# Patient Record
Sex: FEMALE | Race: WHITE | HISPANIC OR LATINO | Employment: FULL TIME | URBAN - METROPOLITAN AREA
[De-identification: names, ages, dates, MRNs, and addresses within clinical notes are randomized per-mention and may not be internally consistent; named-entity substitution may affect disease eponyms.]

---

## 2017-03-20 ENCOUNTER — OFFICE VISIT (OUTPATIENT)
Dept: PEDIATRICS | Facility: CLINIC | Age: 32
End: 2017-03-20
Payer: COMMERCIAL

## 2017-03-20 VITALS
BODY MASS INDEX: 44.72 KG/M2 | OXYGEN SATURATION: 100 % | HEART RATE: 99 BPM | DIASTOLIC BLOOD PRESSURE: 87 MMHG | TEMPERATURE: 99.1 F | SYSTOLIC BLOOD PRESSURE: 132 MMHG | WEIGHT: 293 LBS

## 2017-03-20 DIAGNOSIS — H66.003 ACUTE SUPPURATIVE OTITIS MEDIA OF BOTH EARS WITHOUT SPONTANEOUS RUPTURE OF TYMPANIC MEMBRANES, RECURRENCE NOT SPECIFIED: Primary | ICD-10-CM

## 2017-03-20 DIAGNOSIS — R05.9 COUGH: ICD-10-CM

## 2017-03-20 DIAGNOSIS — J01.00 ACUTE NON-RECURRENT MAXILLARY SINUSITIS: ICD-10-CM

## 2017-03-20 PROCEDURE — 99213 OFFICE O/P EST LOW 20 MIN: CPT | Performed by: PHYSICIAN ASSISTANT

## 2017-03-20 RX ORDER — FLUTICASONE PROPIONATE 50 MCG
2 SPRAY, SUSPENSION (ML) NASAL DAILY
Qty: 1 BOTTLE | Refills: 0 | Status: SHIPPED | OUTPATIENT
Start: 2017-03-20 | End: 2018-12-13

## 2017-03-20 NOTE — PROGRESS NOTES
SUBJECTIVE:                                                    Elodia Huber is a 31 year old female who presents to clinic today for the following health issues    Acute Illness   Acute illness concerns: Possible sinus infection  Onset: 2wks ago with left ear clogging    Fever: no    Chills/Sweats: no    Headache (location?): no    Sinus Pressure:YES- tender, post-nasal drainage, facial pain and teeth pain x last night    Conjunctivitis:  no    Ear Pain: YES: left only,ringing/tenderness    Rhinorrhea: YES    Congestion: YES    Sore Throat: YES- very occasionally with swallowing     Cough: YES-hard to sleep    Wheeze: YES- a little bit at night due to lying down    Decreased Appetite: YES    Nausea: no    Vomiting: no    Diarrhea:  no    Dysuria/Freq.: no    Fatigue/Achiness: YES- fatigue only due to not sleeping and coughing at night    Sick/Strep Exposure: no     Therapies Tried and outcome: Sudafed,Genet Sasser Plus Cold and Cough,Nyquil,cough drops,nasal spray-not working.  No history of asthma, allergies.   Former smoker.   Work: industrial     ROS:  ROS otherwise negative    OBJECTIVE:                                                    /87  Pulse 99  Temp 99.1  F (37.3  C) (Tympanic)  Wt 294 lb 1.6 oz (133.4 kg)  SpO2 100%  BMI 44.72 kg/m2  Body mass index is 44.72 kg/(m^2).   GENERAL: alert, no distress  HENT: ear canals- normal; TMs- erythemic on right; Nose- normal; Mouth- no ulcers, no lesions; max sinus tenderness  NECK: tonsillar LAD  RESP: lungs clear to auscultation - no rales, no rhonchi, no wheezes  CV: regular rates and rhythm, normal S1 S2, no S3 or S4 and no murmur, no click or rub    Diagnostic test results:  No results found for this or any previous visit (from the past 24 hour(s)).     ASSESSMENT/PLAN:                                                    (H66.003) Acute suppurative otitis media of both ears without spontaneous rupture of tympanic membranes, recurrence not  specified  (primary encounter diagnosis)  Comment: begin antibiotics and flonase. Increase fluid intake.   Plan: amoxicillin-clavulanate (AUGMENTIN) 875-125 MG         per tablet, fluticasone (FLONASE) 50 MCG/ACT         spray            (J01.00) Acute non-recurrent maxillary sinusitis  Comment:   Plan: amoxicillin-clavulanate (AUGMENTIN) 875-125 MG         per tablet, fluticasone (FLONASE) 50 MCG/ACT         spray            (R05) Cough  Comment:   Plan:       See Patient Instructions    Mariposa Giles PA-C  [facility]

## 2017-03-20 NOTE — MR AVS SNAPSHOT
After Visit Summary   3/20/2017    Elodia Huber    MRN: [id number]           Patient Information     Date Of Birth          1985        Visit Information        Provider Department      3/20/2017 2:30 PM Mariposa Giles PA-C [facility]        Today's Diagnoses     Acute suppurative otitis media of both ears without spontaneous rupture of tympanic membranes, recurrence not specified    -  1    Acute non-recurrent maxillary sinusitis        Cough          Care Instructions    Begin antibiotics   Increase fluid intake  flonase daily        Follow-ups after your visit        Who to contact     If you have questions or need follow up information about today's clinic visit or your schedule please contact [facility] directly at [phone].  Normal or non-critical lab and imaging results will be communicated to you by Veaconhart, letter or phone within 4 business days after the clinic has received the results. If you do not hear from us within 7 days, please contact the clinic through Veaconhart or phone. If you have a critical or abnormal lab result, we will notify you by phone as soon as possible.  Submit refill requests through [employer] or call your pharmacy and they will forward the refill request to us. Please allow 3 business days for your refill to be completed.          Additional Information About Your Visit        MyChart Information     [employer] gives you secure access to your electronic health record. If you see a primary care provider, you can also send messages to your care team and make appointments. If you have questions, please call your primary care clinic.  If you do not have a primary care provider, please call [phone] and they will assist you.        Care EveryWhere ID     This is your Care EveryWhere ID. This could be used by other organizations to access your [facility] medical records  [id number]        Your Vitals Were     Pulse Temperature  Pulse Oximetry BMI (Body Mass Index)          99 99.1  F (37.3  C) (Tympanic) 100% 44.72 kg/m2         Blood Pressure from Last 3 Encounters:   03/20/17 132/87   09/13/16 138/84   06/22/16 110/74    Weight from Last 3 Encounters:   03/20/17 294 lb 1.6 oz (133.4 kg)   09/13/16 294 lb 14.4 oz (133.8 kg)   06/22/16 291 lb (132 kg)              Today, you had the following     No orders found for display         Today's Medication Changes          These changes are accurate as of: 3/20/17  2:52 PM.  If you have any questions, ask your nurse or doctor.               Start taking these medicines.        Dose/Directions    amoxicillin-clavulanate 875-125 MG per tablet   Commonly known as:  AUGMENTIN   Used for:  Acute suppurative otitis media of both ears without spontaneous rupture of tympanic membranes, recurrence not specified, Acute non-recurrent maxillary sinusitis   Started by:  Mariposa Giles PA-C        Dose:  1 tablet   Take 1 tablet by mouth 2 times daily   Quantity:  20 tablet   Refills:  0       fluticasone 50 MCG/ACT spray   Commonly known as:  FLONASE   Used for:  Acute suppurative otitis media of both ears without spontaneous rupture of tympanic membranes, recurrence not specified, Acute non-recurrent maxillary sinusitis   Started by:  Mariposa Giles PA-C        Dose:  2 spray   Spray 2 sprays into both nostrils daily   Quantity:  1 Bottle   Refills:  0            Where to get your medicines      These medications were sent to [facility] [address] [place] [address] [address]  [address] [facility] [address] [address]     Phone:  [phone]     amoxicillin-clavulanate 875-125 MG per tablet         Some of these will need a paper prescription and others can be bought over the counter.  Ask your nurse if you have questions.     Bring a paper prescription for each of these medications     fluticasone 50 MCG/ACT spray                 Primary Care Provider Office Phone # Fax #    JASON Gallagher [phone]       [facility] [place] [address] [place] [address]        Thank you!     Thank you for choosing [facility]  for your care. Our goal is always to provide you with excellent care. Hearing back from our patients is one way we can continue to improve our services. Please take a few minutes to complete the written survey that you may receive in the mail after your visit with us. Thank you!             Your Updated Medication List - Protect others around you: Learn how to safely use, store and throw away your medicines at www.disposemymeds.org.          This list is accurate as of: 3/20/17  2:52 PM.  Always use your most recent med list.                   Brand Name Dispense Instructions for use    amoxicillin-clavulanate 875-125 MG per tablet    AUGMENTIN    20 tablet    Take 1 tablet by mouth 2 times daily       fluticasone 50 MCG/ACT spray    FLONASE    1 Bottle    Spray 2 sprays into both nostrils daily       norgestimate-ethinyl estradiol 0.25-35 MG-MCG per tablet    ORTHO-CYCLEN, SPRINTEC    84 tablet    Take 1 tablet by mouth daily

## 2017-03-20 NOTE — NURSING NOTE
"Chief Complaint   Patient presents with     URI       Initial /87  Pulse 99  Temp 99.1  F (37.3  C) (Tympanic)  Wt 294 lb 1.6 oz (133.4 kg)  SpO2 100%  BMI 44.72 kg/m2 Estimated body mass index is 44.72 kg/(m^2) as calculated from the following:    Height as of 9/13/16: 5' 8\" (1.727 m).    Weight as of this encounter: 294 lb 1.6 oz (133.4 kg).  Medication Reconciliation: complete    "

## 2017-03-23 ENCOUNTER — TELEPHONE (OUTPATIENT)
Dept: PEDIATRICS | Facility: CLINIC | Age: 32
End: 2017-03-23

## 2017-03-23 NOTE — TELEPHONE ENCOUNTER
Pt notifies that she is feeling way better after started abx(augmentin), still using flonase nasal spray. Most of the sx's are better, but still both ears are plugged. Denies pain, hearing loss, discharge from ears or any new sx's.    Advised pt that it will take more time for the fluids to drain out. Advised to continue abx, flonase nasal spray, decongestant prn, push fluids & monitor. Pt agrees.    Abhijit RN  Triage Nurse

## 2017-03-30 ENCOUNTER — TELEPHONE (OUTPATIENT)
Dept: PEDIATRICS | Facility: CLINIC | Age: 32
End: 2017-03-30

## 2017-03-30 NOTE — TELEPHONE ENCOUNTER
Ears are popping since Monday.  They are clear but then, plug.  Using nasal spray bid.  Antibiotic is done today for sinus infection.  Asked pt to use neti pot and decongestant at this time.  Call for appointment if not improving.  /100 at med clinic at work yesterday.  BP Readings from Last 3 Encounters:   03/20/17 132/87   09/13/16 138/84   06/22/16 110/74   Pt's BP does run a little higher.  Asked her to monitor and schedule appointment if it continues.  She should limit her salt intake.  Maria Elena Carbone RN

## 2017-07-24 DIAGNOSIS — Z30.49 ENCOUNTER FOR SURVEILLANCE OF OTHER CONTRACEPTIVE: ICD-10-CM

## 2017-07-24 RX ORDER — NORGESTIMATE AND ETHINYL ESTRADIOL 0.25-0.035
1 KIT ORAL DAILY
Qty: 84 TABLET | Refills: 0 | Status: SHIPPED | OUTPATIENT
Start: 2017-07-24 | End: 2017-10-06

## 2017-07-24 NOTE — TELEPHONE ENCOUNTER
Patient is calling for refill of oral contraceptive pill.  States that she doesn't usually take the pill daily, but on occasion has skipped the placebos.  Has not misplaced any pill packs.  I spoke with pharmacist and patient did fill the RXs on  09/13/16 12/03/16 01/24/17 04/11/17.    [facility]      Last Written Prescription Date: 09/13/16  Last Fill Quantity: 84,  # refills: 3   Last Office Visit with [facility] or [facility] prescribing provider:09/13/16.    Routing refill request to provider for review/approval because:  Patient is needing a refill two months early .    Order pending for 3 month refill, please sign if in agreement.  ALIA Ogden RN

## 2017-10-06 ENCOUNTER — OFFICE VISIT (OUTPATIENT)
Dept: PEDIATRICS | Facility: CLINIC | Age: 32
End: 2017-10-06
Payer: COMMERCIAL

## 2017-10-06 VITALS
DIASTOLIC BLOOD PRESSURE: 84 MMHG | OXYGEN SATURATION: 99 % | SYSTOLIC BLOOD PRESSURE: 126 MMHG | TEMPERATURE: 98.6 F | HEART RATE: 96 BPM | BODY MASS INDEX: 40.77 KG/M2 | HEIGHT: 68 IN | WEIGHT: 269 LBS

## 2017-10-06 DIAGNOSIS — M72.2 PLANTAR FASCIITIS: ICD-10-CM

## 2017-10-06 DIAGNOSIS — E04.9 THYROID GOITER: ICD-10-CM

## 2017-10-06 DIAGNOSIS — Z00.00 ROUTINE HEALTH MAINTENANCE: Primary | ICD-10-CM

## 2017-10-06 DIAGNOSIS — Z30.49 ENCOUNTER FOR SURVEILLANCE OF OTHER CONTRACEPTIVE: ICD-10-CM

## 2017-10-06 DIAGNOSIS — E55.9 VITAMIN D DEFICIENCY: ICD-10-CM

## 2017-10-06 DIAGNOSIS — E66.9 OBESITY (BMI 30-39.9): ICD-10-CM

## 2017-10-06 PROBLEM — E66.01 MORBID OBESITY (H): Status: ACTIVE | Noted: 2017-10-06

## 2017-10-06 PROCEDURE — 84443 ASSAY THYROID STIM HORMONE: CPT | Performed by: NURSE PRACTITIONER

## 2017-10-06 PROCEDURE — 36415 COLL VENOUS BLD VENIPUNCTURE: CPT | Performed by: NURSE PRACTITIONER

## 2017-10-06 PROCEDURE — 99212 OFFICE O/P EST SF 10 MIN: CPT | Mod: 25 | Performed by: NURSE PRACTITIONER

## 2017-10-06 PROCEDURE — G0145 SCR C/V CYTO,THINLAYER,RESCR: HCPCS | Performed by: NURSE PRACTITIONER

## 2017-10-06 PROCEDURE — 99395 PREV VISIT EST AGE 18-39: CPT | Performed by: NURSE PRACTITIONER

## 2017-10-06 PROCEDURE — 87624 HPV HI-RISK TYP POOLED RSLT: CPT | Performed by: NURSE PRACTITIONER

## 2017-10-06 RX ORDER — NORGESTIMATE AND ETHINYL ESTRADIOL 0.25-0.035
1 KIT ORAL DAILY
Qty: 84 TABLET | Refills: 3 | Status: SHIPPED | OUTPATIENT
Start: 2017-10-06 | End: 2018-09-14

## 2017-10-06 NOTE — NURSING NOTE
"Chief Complaint   Patient presents with     Physical       Initial /84 (Cuff Size: Adult Large)  Pulse 96  Temp 98.6  F (37  C) (Tympanic)  Ht 5' 8\" (1.727 m)  Wt 269 lb (122 kg)  LMP 09/14/2017 (Exact Date)  SpO2 99%  BMI 40.9 kg/m2 Estimated body mass index is 40.9 kg/(m^2) as calculated from the following:    Height as of this encounter: 5' 8\" (1.727 m).    Weight as of this encounter: 269 lb (122 kg).  Medication Reconciliation: complete   Chica Jesus CMA    "

## 2017-10-06 NOTE — PROGRESS NOTES
SUBJECTIVE:   CC: Elodia Huber is an 32 year old woman who presents for preventive health visit.     Physical   Annual:     Getting at least 3 servings of Calcium per day::  Yes    Bi-annual eye exam::  NO    Dental care twice a year::  NO    Sleep apnea or symptoms of sleep apnea::  None    Diet::  Vegetarian/vegan    Frequency of exercise::  2-3 days/week    Duration of exercise::  15-30 minutes    Taking medications regularly::  Yes    Medication side effects::  None    Additional concerns today::  No    -------------------------------------    Today's PHQ-2 Score:   PHQ-2 ( 1999 [employer]) 10/6/2017   Q1: Little interest or pleasure in doing things 0   Q2: Feeling down, depressed or hopeless 0   PHQ-2 Score 0   Q1: Little interest or pleasure in doing things Not at all   Q2: Feeling down, depressed or hopeless Not at all   PHQ-2 Score 0       Abuse: Current or Past(Physical, Sexual or Emotional)- No  Do you feel safe in your environment - Yes    Social History   Substance Use Topics     Smoking status: Former Smoker     Types: Cigarettes     Quit date: 6/1/2015     Smokeless tobacco: Never Used     Alcohol use 0.0 oz/week     0 Standard drinks or equivalent per week      Comment: 1 time per week, 2 per time     The patient does not drink >3 drinks per day nor >7 drinks per week.    Reviewed orders with patient.  Reviewed health maintenance and updated orders accordingly - Yes  Labs reviewed in EPIC    Mammogram not appropriate for this patient based on age.    Pertinent mammograms are reviewed under the imaging tab.  History of abnormal Pap smear: NO - age 30-65 PAP every 5 years with negative HPV co-testing recommended    Reviewed and updated as needed this visit by clinical staff  Tobacco  Allergies  Meds  Med Hx  Surg Hx  Fam Hx  Soc Hx        Reviewed and updated as needed this visit by Provider          Patient with right heel pain. Jogs in place every morning without shoes    ROS:  C: NEGATIVE for  "fever, chills, change in weight  I: NEGATIVE for worrisome rashes, moles or lesions  E: NEGATIVE for vision changes or irritation  ENT: NEGATIVE for ear, mouth and throat problems  R: NEGATIVE for significant cough or SOB  B: NEGATIVE for masses, tenderness or discharge  CV: NEGATIVE for chest pain, palpitations or peripheral edema  GI: NEGATIVE for nausea, abdominal pain, heartburn, or change in bowel habits  : NEGATIVE for unusual urinary or vaginal symptoms. Periods are regular.  M: Right heel plantar pain. NEGATIVE for significant arthralgias or myalgia  N: NEGATIVE for weakness, dizziness or paresthesias  P: NEGATIVE for changes in mood or affect     OBJECTIVE:   /84 (Cuff Size: Adult Large)  Pulse 96  Temp 98.6  F (37  C) (Tympanic)  Ht 5' 8\" (1.727 m)  Wt 269 lb (122 kg)  LMP 09/14/2017 (Exact Date)  SpO2 99%  BMI 40.9 kg/m2  EXAM:  GENERAL: healthy, alert and no distress  EYES: Eyes grossly normal to inspection, PERRL and conjunctivae and sclerae normal  HENT: ear canals and TM's normal, nose and mouth without ulcers or lesions  NECK: no adenopathy, no asymmetry, masses, or scars and thyroid normal to palpation  RESP: lungs clear to auscultation - no rales, rhonchi or wheezes  BREAST: normal without masses, tenderness or nipple discharge and no palpable axillary masses or adenopathy  CV: regular rate and rhythm, normal S1 S2, no S3 or S4, no murmur, click or rub, no peripheral edema and peripheral pulses strong  ABDOMEN: soft, nontender, no hepatosplenomegaly, no masses and bowel sounds normal   (female): normal female external genitalia, normal urethral meatus, vaginal mucosa pink, moist, well rugated, and normal cervix/adnexa/uterus without masses or discharge  MS: no gross musculoskeletal defects noted, no edema  SKIN: no suspicious lesions or rashes  NEURO: Normal strength and tone, mentation intact and speech normal  PSYCH: mentation appears normal, affect " "normal/bright    ASSESSMENT/PLAN:   (Z00.00) Routine health maintenance  (primary encounter diagnosis)  Plan: Pap imaged thin layer screen with HPV -         recommended age 30 - 65 years (select HPV order        below), HPV High Risk Types DNA Cervical, TSH         with free T4 reflex            (Z30.49) Encounter for surveillance of other contraceptive  Comment: No contraindications.   Plan: norgestimate-ethinyl estradiol (ORTHO-CYCLEN,         SPRINTEC) 0.25-35 MG-MCG per tablet            (E66.9) Obesity (BMI 30-39.9)  Comment: Has lost 30 lbs jogging in place in her living room.   Plan: Goal is to be at 150 in 2 months. RTC for weight check 2 months.     (E04.9) Thyroid goiter  Comment: Hx thyroid issue.   Plan: TSH with T4 reflex today.     (E55.9) Vitamin D deficiency  Comment: Doesn't want to pay for vitamin D testing if possible.   Plan: Start 2,000 iu vitamin D3 daily.     (M72.2) Plantar fasciitis  Comment: Patient with right heel pain. Jogs in place every morning without shoes. Sx consistent with plantar fascitis.   Plan: Morning/evening stretching.  Supportive shoes  Never barefoot.         COUNSELING:  Reviewed preventive health counseling, as reflected in patient instructions    BP Screening:   Last 3 BP Readings:    BP Readings from Last 3 Encounters:   10/06/17 126/84   03/20/17 132/87   09/13/16 138/84       The following was recommended to the patient:  Re-screen BP within a year and recommended lifestyle modifications     reports that she quit smoking about 2 years ago. Her smoking use included Cigarettes. She has never used smokeless tobacco.    Estimated body mass index is 40.9 kg/(m^2) as calculated from the following:    Height as of this encounter: 5' 8\" (1.727 m).    Weight as of this encounter: 269 lb (122 kg).   Weight management plan: Discussed healthy diet and exercise guidelines and patient will follow up in 2 months in clinic to re-evaluate.    Counseling Resources:  ATP IV " Guidelines  Pooled Cohorts Equation Calculator  Breast Cancer Risk Calculator  FRAX Risk Assessment  ICSI Preventive Guidelines  Dietary Guidelines for Americans, 2010  [employer]'s MyPlate  ASA Prophylaxis  Lung CA Screening    Bridgett Junior, JASON FARFAN  [facility]

## 2017-10-06 NOTE — PATIENT INSTRUCTIONS
Goal 250 in 2 months. Come in for a weight check.     2,000 iu vitamin D3 per day.     Preventive Health Recommendations  Female Ages 26 - 39  Yearly exam:   See your health care provider every year in order to    Review health changes.     Discuss preventive care.      Review your medicines if you your doctor has prescribed any.    Until age 30: Get a Pap test every three years (more often if you have had an abnormal result).    After age 30: Talk to your doctor about whether you should have a Pap test every 3 years or have a Pap test with HPV screening every 5 years.   You do not need a Pap test if your uterus was removed (hysterectomy) and you have not had cancer.  You should be tested each year for STDs (sexually transmitted diseases), if you're at risk.   Talk to your provider about how often to have your cholesterol checked.  If you are at risk for diabetes, you should have a diabetes test (fasting glucose).  Shots: Get a flu shot each year. Get a tetanus shot every 10 years.   Nutrition:     Eat at least 5 servings of fruits and vegetables each day.    Eat whole-grain bread, whole-wheat pasta and brown rice instead of white grains and rice.    Talk to your provider about Calcium and Vitamin D.     Lifestyle    Exercise at least 150 minutes a week (30 minutes a day, 5 days of the week). This will help you control your weight and prevent disease.    Limit alcohol to one drink per day.    No smoking.     Wear sunscreen to prevent skin cancer.    See your dentist every six months for an exam and cleaning.

## 2017-10-06 NOTE — MR AVS SNAPSHOT
After Visit Summary   10/6/2017    Elodia Huber    MRN: [id number]           Patient Information     Date Of Birth          1985        Visit Information        Provider Department      10/6/2017 3:20 PM Bridgett Junior APRN [facility]        Today's Diagnoses     Routine health maintenance    -  1    Encounter for surveillance of other contraceptive        Obesity (BMI 30-39.9)        Thyroid goiter        Vitamin D deficiency          Care Instructions    Goal 250 in 2 months. Come in for a weight check.     2,000 iu vitamin D3 per day.     Preventive Health Recommendations  Female Ages 26 - 39  Yearly exam:   See your health care provider every year in order to    Review health changes.     Discuss preventive care.      Review your medicines if you your doctor has prescribed any.    Until age 30: Get a Pap test every three years (more often if you have had an abnormal result).    After age 30: Talk to your doctor about whether you should have a Pap test every 3 years or have a Pap test with HPV screening every 5 years.   You do not need a Pap test if your uterus was removed (hysterectomy) and you have not had cancer.  You should be tested each year for STDs (sexually transmitted diseases), if you're at risk.   Talk to your provider about how often to have your cholesterol checked.  If you are at risk for diabetes, you should have a diabetes test (fasting glucose).  Shots: Get a flu shot each year. Get a tetanus shot every 10 years.   Nutrition:     Eat at least 5 servings of fruits and vegetables each day.    Eat whole-grain bread, whole-wheat pasta and brown rice instead of white grains and rice.    Talk to your provider about Calcium and Vitamin D.     Lifestyle    Exercise at least 150 minutes a week (30 minutes a day, 5 days of the week). This will help you control your weight and prevent disease.    Limit alcohol to one drink per day.    No smoking.     Wear  "sunscreen to prevent skin cancer.    See your dentist every six months for an exam and cleaning.            Follow-ups after your visit        Who to contact     If you have questions or need follow up information about today's clinic visit or your schedule please contact [facility] directly at [phone].  Normal or non-critical lab and imaging results will be communicated to you by MyChart, letter or phone within 4 business days after the clinic has received the results. If you do not hear from us within 7 days, please contact the clinic through [employer] or phone. If you have a critical or abnormal lab result, we will notify you by phone as soon as possible.  Submit refill requests through [employer] or call your pharmacy and they will forward the refill request to us. Please allow 3 business days for your refill to be completed.          Additional Information About Your Visit        MyChart Information     [employer] gives you secure access to your electronic health record. If you see a primary care provider, you can also send messages to your care team and make appointments. If you have questions, please call your primary care clinic.  If you do not have a primary care provider, please call [phone] and they will assist you.        Care EveryWhere ID     This is your Care EveryWhere ID. This could be used by other organizations to access your [facility] medical records  [id number]        Your Vitals Were     Pulse Temperature Height Last Period Pulse Oximetry BMI (Body Mass Index)    96 98.6  F (37  C) (Tympanic) 5' 8\" (1.727 m) 09/14/2017 (Exact Date) 99% 40.9 kg/m2       Blood Pressure from Last 3 Encounters:   10/06/17 126/84   03/20/17 132/87   09/13/16 138/84    Weight from Last 3 Encounters:   10/06/17 269 lb (122 kg)   03/20/17 294 lb 1.6 oz (133.4 kg)   09/13/16 294 lb 14.4 oz (133.8 kg)              We Performed the Following     HPV High Risk Types DNA Cervical     Pap imaged thin layer " screen with HPV - recommended age 30 - 65 years (select HPV order below)     TSH with free T4 reflex          Where to get your medicines      These medications were sent to [id number] - [place], [place] - [address] AT [facility][note 3] & formerly [facility]  [address] [place] [address], [place] [address]     Phone:  [phone]     norgestimate-ethinyl estradiol 0.25-35 MG-MCG per tablet          Primary Care Provider Office Phone # Fax #    JASON Gallagher [phone] [address] [place] [address]        Equal Access to Services     [facility]: Hadii shanna thomas hadcamden Olson, waaxda luqadaha, qaybta kaalmada tamieyajordan, jonh miles . So [facility] [phone].    ATENCIÓN: Si habla español, tiene a way disposición servicios gratuitos de asistencia lingüística. [facility] [phone].    We comply with applicable federal civil rights laws and [place] laws. We do not discriminate on the basis of race, color, national origin, age, disability, sex, sexual orientation, or gender identity.            Thank you!     Thank you for choosing [facility]  for your care. Our goal is always to provide you with excellent care. Hearing back from our patients is one way we can continue to improve our services. Please take a few minutes to complete the written survey that you may receive in the mail after your visit with us. Thank you!             Your Updated Medication List - Protect others around you: Learn how to safely use, store and throw away your medicines at www.disposemymeds.org.          This list is accurate as of: 10/6/17  4:00 PM.  Always use your most recent med list.                   Brand Name Dispense Instructions for use Diagnosis    fluticasone 50 MCG/ACT spray    FLONASE    1 Bottle    Spray 2 sprays into both nostrils daily    Acute suppurative otitis media of both ears without spontaneous rupture of  tympanic membranes, recurrence not specified, Acute non-recurrent maxillary sinusitis       norgestimate-ethinyl estradiol 0.25-35 MG-MCG per tablet    ORTHO-CYCLEN, SPRINTEC    84 tablet    Take 1 tablet by mouth daily    Encounter for surveillance of other contraceptive

## 2017-10-07 LAB — TSH SERPL DL<=0.005 MIU/L-ACNC: 0.96 MU/L (ref 0.4–4)

## 2017-10-10 LAB
COPATH REPORT: NORMAL
PAP: NORMAL

## 2017-10-12 LAB
FINAL DIAGNOSIS: NORMAL
HPV HR 12 DNA CVX QL NAA+PROBE: NEGATIVE
HPV16 DNA SPEC QL NAA+PROBE: NEGATIVE
HPV18 DNA SPEC QL NAA+PROBE: NEGATIVE
SPECIMEN DESCRIPTION: NORMAL

## 2018-09-14 DIAGNOSIS — Z30.49 ENCOUNTER FOR SURVEILLANCE OF OTHER CONTRACEPTIVE: ICD-10-CM

## 2018-09-14 NOTE — TELEPHONE ENCOUNTER
"Requested Prescriptions   Pending Prescriptions Disp Refills     ESTARYLLA 0.25-35 MG-MCG per tablet [Pharmacy Med Name: ESTARYLLA TABLETS 28S] 84 tablet 0    Last Written Prescription Date:  10/06/2017  Last Fill Quantity: 84 tablet,  # refills: 3   Last office visit: 10/6/2017 with prescribing provider:  EMILEE Junior   Future Office Visit:     Sig: TAKE 1 TABLET BY MOUTH DAILY    Contraceptives Protocol Passed    9/14/2018 12:56 PM       Passed - Patient is not a current smoker if age is 35 or older       Passed - Recent (12 mo) or future (30 days) visit within the authorizing provider's specialty    Patient had office visit in the last 12 months or has a visit in the next 30 days with authorizing provider or within the authorizing provider's specialty.  See \"Patient Info\" tab in inbasket, or \"Choose Columns\" in Meds & Orders section of the refill encounter.           Passed - No active pregnancy on record       Passed - No positive pregnancy test in past 12 months          "

## 2018-09-18 RX ORDER — NORGESTIMATE AND ETHINYL ESTRADIOL 0.25-0.035
KIT ORAL
Qty: 84 TABLET | Refills: 0 | Status: SHIPPED | OUTPATIENT
Start: 2018-09-18 | End: 2018-12-05

## 2018-12-05 ENCOUNTER — NURSE TRIAGE (OUTPATIENT)
Dept: NURSING | Facility: CLINIC | Age: 33
End: 2018-12-05

## 2018-12-05 DIAGNOSIS — Z30.49 ENCOUNTER FOR SURVEILLANCE OF OTHER CONTRACEPTIVE: ICD-10-CM

## 2018-12-05 RX ORDER — NORGESTIMATE AND ETHINYL ESTRADIOL 0.25-0.035
1 KIT ORAL DAILY
Qty: 84 TABLET | Refills: 0 | Status: SHIPPED | OUTPATIENT
Start: 2018-12-05 | End: 2019-02-24

## 2018-12-06 NOTE — TELEPHONE ENCOUNTER
Patient going to run out of her birth control.  Schedulers helped her make an appointment on December 13, 2018.  Did order one refill sent to her [facility]'s pharmacy.  Routed to  triage station A  Kaur Moe RN  [facility]

## 2018-12-06 NOTE — TELEPHONE ENCOUNTER
Additional Information    Caller requesting a refill, no triage required, and triager able to refill per unit policy    Protocols used: MEDICATION QUESTION CALL-ADULT-AH

## 2018-12-06 NOTE — TELEPHONE ENCOUNTER
Appointment scheduled on 12/13/18 for px & 3 months supply sent yesterday.    Abhijit, RN  Triage Nurse

## 2018-12-06 NOTE — TELEPHONE ENCOUNTER
Patient going to run out of her birth control.  Schedulers helped her make an appointment on December 13, 2018.  Did order one refill sent to her pharmacy.  Routed message to provider.  Kaur Moe RN  [facility]

## 2018-12-13 ENCOUNTER — OFFICE VISIT (OUTPATIENT)
Dept: PEDIATRICS | Facility: CLINIC | Age: 33
End: 2018-12-13
Payer: COMMERCIAL

## 2018-12-13 DIAGNOSIS — Z00.00 ENCOUNTER FOR ROUTINE ADULT HEALTH EXAMINATION WITHOUT ABNORMAL FINDINGS: Primary | ICD-10-CM

## 2018-12-13 DIAGNOSIS — Z13.1 SCREENING FOR DIABETES MELLITUS: ICD-10-CM

## 2018-12-13 DIAGNOSIS — E66.01 OBESITY, CLASS III, BMI 40-49.9 (MORBID OBESITY) (H): ICD-10-CM

## 2018-12-13 DIAGNOSIS — Z23 NEED FOR PROPHYLACTIC VACCINATION AND INOCULATION AGAINST INFLUENZA: ICD-10-CM

## 2018-12-13 LAB
CHOLEST SERPL-MCNC: 163 MG/DL
HBA1C MFR BLD: 5.1 % (ref 0–5.6)
HDLC SERPL-MCNC: 57 MG/DL
LDLC SERPL CALC-MCNC: 82 MG/DL
NONHDLC SERPL-MCNC: 106 MG/DL
TRIGL SERPL-MCNC: 122 MG/DL

## 2018-12-13 PROCEDURE — 36415 COLL VENOUS BLD VENIPUNCTURE: CPT | Performed by: INTERNAL MEDICINE

## 2018-12-13 PROCEDURE — 90471 IMMUNIZATION ADMIN: CPT | Performed by: STUDENT IN AN ORGANIZED HEALTH CARE EDUCATION/TRAINING PROGRAM

## 2018-12-13 PROCEDURE — 90686 IIV4 VACC NO PRSV 0.5 ML IM: CPT | Performed by: STUDENT IN AN ORGANIZED HEALTH CARE EDUCATION/TRAINING PROGRAM

## 2018-12-13 PROCEDURE — 83036 HEMOGLOBIN GLYCOSYLATED A1C: CPT | Performed by: INTERNAL MEDICINE

## 2018-12-13 PROCEDURE — 80061 LIPID PANEL: CPT | Performed by: INTERNAL MEDICINE

## 2018-12-13 PROCEDURE — 99395 PREV VISIT EST AGE 18-39: CPT | Mod: GC | Performed by: STUDENT IN AN ORGANIZED HEALTH CARE EDUCATION/TRAINING PROGRAM

## 2018-12-13 SDOH — HEALTH STABILITY: PHYSICAL HEALTH: ON AVERAGE, HOW MANY MINUTES DO YOU ENGAGE IN EXERCISE AT THIS LEVEL?: 30 MIN

## 2018-12-13 SDOH — HEALTH STABILITY: PHYSICAL HEALTH: ON AVERAGE, HOW MANY DAYS PER WEEK DO YOU ENGAGE IN MODERATE TO STRENUOUS EXERCISE (LIKE A BRISK WALK)?: 2 DAYS

## 2018-12-13 ASSESSMENT — ENCOUNTER SYMPTOMS
HEARTBURN: 0
NERVOUS/ANXIOUS: 1
HEMATURIA: 0
PARESTHESIAS: 0
HEMATOCHEZIA: 0
CONSTIPATION: 0
PALPITATIONS: 0
DIARRHEA: 0
SHORTNESS OF BREATH: 0
SORE THROAT: 0
DYSURIA: 0
NAUSEA: 0
FEVER: 0
DIZZINESS: 0
FREQUENCY: 0
ARTHRALGIAS: 0
EYE PAIN: 0
ABDOMINAL PAIN: 0
HEADACHES: 0
CHILLS: 0
WEAKNESS: 0
MYALGIAS: 0
BREAST MASS: 0
COUGH: 0
JOINT SWELLING: 0

## 2018-12-13 ASSESSMENT — MIFFLIN-ST. JEOR: SCORE: 1972.77

## 2018-12-13 NOTE — PROGRESS NOTES
"   SUBJECTIVE:   CC: Elodia Huber is an 33 year old woman who presents for preventive health visit.     Physical   Annual:     Getting at least 3 servings of Calcium per day:  Yes    Bi-annual eye exam:  NO    Dental care twice a year:  NO    Sleep apnea or symptoms of sleep apnea:  None    Diet:  Vegetarian/vegan    Frequency of exercise:  2-3 days/week    Duration of exercise:  30-45 minutes    Taking medications regularly:  Yes    Medication side effects:  None    Additional concerns today:  No      Today's PHQ-2 Score:   PHQ-2 ( 1999 Pf[employer]) 12/13/2018   Q1: Little interest or pleasure in doing things 0   Q2: Feeling down, depressed or hopeless 0   PHQ-2 Score 0   Q1: Little interest or pleasure in doing things Not at all   Q2: Feeling down, depressed or hopeless Not at all   PHQ-2 Score 0     Concerns:  1. Weight  Has been working on losing weight over the last several years. Per last annual visit, had successfully lost ~50 lbs by jogging in place. Over the last year though weight has stayed stable. Difficulty finding time to exercise - has a stationary bike at home but doesn't wake up early in the morning to work out. Exercises ~1-2 times a week, 30 minutes each time. She is a vegetarian, thinks she eats pretty healthy for lunch and dinner as she does meal plan for the week, but has unhealthy snacks at work. Has large portion sizes, is a \".\" No pop/soda. Adds sugar to her coffee only on weekends, otherwise drinks it black. Occasional sugary drinks/juice.     Wonders if may be due to thyroid but has no other sxs of hypothyroidism and has had previously normal values, last in 2017.     2. HM  Pap normal, HPV negative in 2017.  Needs Tdap but does not want it today.  Would like flu shot today.  Not interested in STD screening - monogamous with same male partner since age 16.     Abuse: Current or Past(Physical, Sexual or Emotional)- No  Do you feel safe in your environment? Yes    Social History "     Tobacco Use     Smoking status: Former Smoker     Types: Cigarettes     Last attempt to quit: 6/1/2015     Years since quitting: 3.5     Smokeless tobacco: Never Used   Substance Use Topics     Alcohol use: Yes     Alcohol/week: 0.0 oz     Comment: 1 time per week, 2 per time     Alcohol Use 12/13/2018   If you drink alcohol do you typically have greater than 3 drinks per day OR greater than 7 drinks per week? No       Reviewed orders with patient.  Reviewed health maintenance and updated orders accordingly - Yes  BP Readings from Last 3 Encounters:   12/13/18 130/90   10/06/17 126/84   03/20/17 132/87    Wt Readings from Last 3 Encounters:   12/13/18 121.9 kg (268 lb 12.8 oz)   10/06/17 122 kg (269 lb)   03/20/17 133.4 kg (294 lb 1.6 oz)                  Patient Active Problem List   Diagnosis     CARDIOVASCULAR SCREENING; LDL GOAL LESS THAN 160     Eczema     Obesity (BMI 30-39.9)     Contraceptive management     Cystic acne     Milk intolerance     Thyroid goiter     Vitamin D deficiency     Morbid obesity (H)     Past Surgical History:   Procedure Laterality Date     TONSILLECTOMY ADULT  2010       Social History     Tobacco Use     Smoking status: Former Smoker     Types: Cigarettes     Last attempt to quit: 6/1/2015     Years since quitting: 3.5     Smokeless tobacco: Never Used   Substance Use Topics     Alcohol use: Yes     Alcohol/week: 0.0 oz     Comment: 1 time per week, 2 per time     Family History   Problem Relation Age of Onset     Cerebrovascular Disease Father 52     Hypertension Father      Cancer Paternal Grandmother         breast     Cancer Maternal Grandfather         unknown         Current Outpatient Medications   Medication Sig Dispense Refill     norgestimate-ethinyl estradiol (ESTARYLLA) 0.25-35 MG-MCG tablet Take 1 tablet by mouth daily 84 tablet 0     Allergies   Allergen Reactions     No Known Drug Allergies      Recent Labs   Lab Test 12/13/18  1036 10/06/17  1612 09/25/13  0849    A1C 5.1  --   --    LDL  --   --  108   HDL  --   --  40*   TRIG  --   --  97   ALT  --   --  23   CR  --   --  0.55   GFRESTIMATED  --   --  >90   GFRESTBLACK  --   --  >90   POTASSIUM  --   --  4.2   TSH  --  0.96 1.01        Mammogram not appropriate for this patient based on age.    Pertinent mammograms are reviewed under the imaging tab.  History of abnormal Pap smear:   NO - age 30-65 PAP every 5 years with negative HPV co-testing recommended  Last 3 Pap Results:   PAP (no units)   Date Value   10/06/2017 NIL   12/24/2014 NIL   05/02/2012 NIL     PAP / HPV Latest Ref Rng & Units 10/6/2017 12/24/2014 5/2/2012   PAP - NIL NIL NIL   HPV 16 DNA NEG:Negative Negative - -   HPV 18 DNA NEG:Negative Negative - -   OTHER HR HPV NEG:Negative Negative - -     Reviewed and updated as needed this visit by clinical staff  Tobacco  Allergies  Med Hx  Surg Hx  Fam Hx  Soc Hx        Reviewed and updated as needed this visit by Provider        History reviewed. No pertinent past medical history.   Past Surgical History:   Procedure Laterality Date     TONSILLECTOMY ADULT  2010       Review of Systems   Constitutional: Negative for chills and fever.   HENT: Positive for congestion. Negative for ear pain, hearing loss and sore throat.    Eyes: Negative for pain and visual disturbance.   Respiratory: Negative for cough and shortness of breath.    Cardiovascular: Negative for chest pain, palpitations and peripheral edema.   Gastrointestinal: Negative for abdominal pain, constipation, diarrhea, heartburn, hematochezia and nausea.   Breasts:  Negative for tenderness, breast mass and discharge.   Genitourinary: Negative for dysuria, frequency, genital sores, hematuria, pelvic pain, urgency, vaginal bleeding and vaginal discharge.   Musculoskeletal: Negative for arthralgias, joint swelling and myalgias.   Skin: Negative for rash.   Neurological: Negative for dizziness, weakness, headaches and paresthesias.  "  Psychiatric/Behavioral: Negative for mood changes. The patient is nervous/anxious.         OBJECTIVE:   /90   Pulse 89   Temp 98.1  F (36.7  C) (Oral)   Ht 1.727 m (5' 8\")   Wt 121.9 kg (268 lb 12.8 oz)   SpO2 99%   BMI 40.87 kg/m    Physical Exam     General: awake, alert, in no acute distress. Obese.  HEENT: NCAT, PERRL, EOMI, sclera anicteric, no nasal discharge, MMM, posterior pharynx without erythema or exudates, no cervical lymphadenopathy  CV: RRR, no murmurs noted, peripheral pulses strong  Resp: CTAB, no increased WOB  Abd: Soft, nontender, nondistended, +BS, no rebound or guarding  MSK: No peripheral edema, extremities warm and well perfused, normal pulses  Skin: warm, dry, no jaundice  Neuro: CN II-XII grossly intact. No focal deficits. Alert and oriented x4.      Diagnostic Test Results:  Results for orders placed or performed in visit on 12/13/18 (from the past 24 hour(s))   Hemoglobin A1c   Result Value Ref Range    Hemoglobin A1C 5.1 0 - 5.6 %     Lipid panel pending.    ASSESSMENT/PLAN:   1. Encounter for routine adult health examination without abnormal findings  - Lipid panel reflex to direct LDL Fasting  - Recommend vitamin D 4826-9834 units daily.  - Keep eye on blood pressure - has been borderline elevated, which may be due to anxiety from being in doctor's office but also may be related to obesity.   - Declined Tdap today.    2. Need for prophylactic vaccination and inoculation against influenza  - FLU VACCINE, SPLIT VIRUS, IM (QUADRIVALENT) [28377]- >3 YRS  - Vaccine Administration, Initial [42522]    3. Screening for diabetes mellitus  - Hemoglobin A1c    4. Obesity, Class III, BMI 40-49.9 (morbid obesity)  Discussed weight and diet extensively today. Patient is motivated to lose weight but barriers include not enough time to exercise, portion control, snacking during the day, enjoys sweet treats, difficulty finding motivation to exercise more frequently.   - Goal exercise 5 " "times weekly for >30 minutes each  - Replace sugary fatty snacks during day with healthy alternatives - fresh or dried fruit, nuts, etc.  - If stationary bike not motivating, consider looking for classes or sports that may be more interesting and easier to stay committed  - Consider Fit Bit to stay accountable  - Find exercise partner  - Work on portion control  - Given information for 24 week weight loss class. She will read about it and consider referral at next visit. Will also consider phentermine at next visit.         COUNSELING:  Reviewed preventive health counseling, as reflected in patient instructions  Special attention given to:        Regular exercise       Healthy diet/nutrition    BP Readings from Last 1 Encounters:   12/13/18 130/90     Estimated body mass index is 40.87 kg/m  as calculated from the following:    Height as of this encounter: 1.727 m (5' 8\").    Weight as of this encounter: 121.9 kg (268 lb 12.8 oz).    BP Screening:   Last 3 BP Readings:    BP Readings from Last 3 Encounters:   12/13/18 130/90   10/06/17 126/84   03/20/17 132/87       The following was recommended to the patient:  Re-screen BP within a year and recommended lifestyle modifications  Weight management plan: Discussed healthy diet and exercise guidelines and given information for 24 week weight loss course, will make referral at next visit if interested.     reports that she quit smoking about 3 years ago. Her smoking use included cigarettes. she has never used smokeless tobacco.      Counseling Resources:  ATP IV Guidelines  Pooled Cohorts Equation Calculator  Breast Cancer Risk Calculator  FRAX Risk Assessment  ICSI Preventive Guidelines  Dietary Guidelines for Americans, 2010  USDA's MyPlate  ASA Prophylaxis  Lung CA Screening    RTC in 2 months for weight recheck, possible referral to bariatric clinic, consider starting phentermine, recheck BP.     Patient was seen and discussed with attending, Dr. Judah Camacho, who " agrees with the above assessment and plan.    Pamela Schulte MD  PGY - 3  Internal Medicine/Pediatrics  Pager [phone]  [facility]    Injectable Influenza Immunization Documentation    1.  Is the person to be vaccinated sick today?   No    2. Does the person to be vaccinated have an allergy to a component   of the vaccine?   No  Egg Allergy Algorithm Link    3. Has the person to be vaccinated ever had a serious reaction   to influenza vaccine in the past?   No    4. Has the person to be vaccinated ever had Guillain-Barré syndrome?   No    Form completed by Pamela Schulte MD       I have seen and discussed this patient with Dr. Schulte and agree with joint documentation as noted above.    Judah Camacho MD  Attending Internal Medicine/Pediatrics Physician

## 2018-12-13 NOTE — PATIENT INSTRUCTIONS
Thank you for coming to clinic today. It was a pleasure to see you and I would be happy to see you back at any time for follow up or for new health issues.    1. Weight loss  - Exercise goal 5 times a week 30 minutes each time  - great job with meal prepping! Try to avoid sugary fatty snacks while at work and instead replace with fruit (fresh or dry), nuts, other small snacks you might enjoy.   - if stationary bike isn't that appealing to you, consider looking to see if there are classes (spin classes, pilates, lloyd, etc) that might be more fun for you.   - Consider finding an exercise partner that you can help keep each other accountable!  - Consider getting a pedometer like a Fit Bit and try to get 45071 steps daily...might appeal to your competitive nature!  - Will check glucose today to make sure no prediabetes.  - Always an option to refer to bariatric clinic for medical management of weight loss. Let us know at any time if you would like a referral. Medications are an option - you can read phentermine.     2. Recommend 1000 to 2000 units of Vitamin D daily.    3. Flu shot today. You can get a tetanus booster any time.     4. Next pap smear in 2022 (last was in 2017, due every 5 years).    5. Keeping an eye on your blood pressure. Expect this will get better with weight loss.     6. Labs today - Hgb A1c and lipid panel.       See you in 2 months for weight and diet check!    Pamela Schulte MD      Preventive Health Recommendations  Female Ages 26 - 39  Yearly exam:   See your health care provider every year in order to    Review health changes.     Discuss preventive care.      Review your medicines if you your doctor has prescribed any.    Until age 30: Get a Pap test every three years (more often if you have had an abnormal result).    After age 30: Talk to your doctor about whether you should have a Pap test every 3 years or have a Pap test with HPV screening every 5 years.   You do not need a Pap test if your  uterus was removed (hysterectomy) and you have not had cancer.  You should be tested each year for STDs (sexually transmitted diseases), if you're at risk.   Talk to your provider about how often to have your cholesterol checked.  If you are at risk for diabetes, you should have a diabetes test (fasting glucose).  Shots: Get a flu shot each year. Get a tetanus shot every 10 years.   Nutrition:     Eat at least 5 servings of fruits and vegetables each day.    Eat whole-grain bread, whole-wheat pasta and brown rice instead of white grains and rice.    Get adequate Calcium and Vitamin D.     Lifestyle    Exercise at least 150 minutes a week (30 minutes a day, 5 days of the week). This will help you control your weight and prevent disease.    Limit alcohol to one drink per day.    No smoking.     Wear sunscreen to prevent skin cancer.    See your dentist every six months for an exam and cleaning.      Patient Education     Weight Management: Overcoming Your Barriers  You may have many reasons why you re not ready to lose weight. You may not feel you have the time or the skills. You may be afraid of losing weight and gaining it back again. Well, you can lose weight. And you can keep the weight off, if you make changes slowly and stick with them. Remember that you may never find the perfect time to lose weight. Decide that the right time to be healthier is now.  Common barriers  Barrier 1: I don t want to deny myself.  Barrier Buster: You don t have to! Moderation is the key:    Watch portion sizes and know when you're eating more than one serving.    Plan to ask for a doggy bag when you eat out.    Have just one.    Read the labels on foods to know what foods may be hiding calories or salt.    Choose lower-fat and lower-calorie versions of your favorites.    Use a small plate instead of a normal-sized plate.   Barrier 2: I lost weight before but I gained it right back.  Barrier Buster: Make this time different:    List  what worked and didn t work last time and what you can try this time.    Choose changes that you are willing to stick with.    Work exercise into your weight-loss plan.    Be realistic about what is possible. Your plan has to fit into your life in a balanced way that works for you.   Barrier 3: I don t have the time to be active.  Barrier Buster: It takes just a few minutes a day!    Be active with a pet or the kids.    Block off activity time in your schedule.    Borrow some time that you usually spend watching TV.    You are too important not to take time to exercise--it is your life!   Feel good about yourself  Do you eat more because you feel bad about yourself, then feel even worse as you gain weight? This is a  vicious cycle.  Breaking this cycle is not easy. You may need group support or counseling. Always remember that you are a valuable person, no matter what size or shape you are.  Do you have a health problem? If so, don t use it as an excuse for not losing weight. Ask your healthcare provider or dietitian about methods to lose weight that are safe for you. For example, even if you have severe arthritis, it may be easier for you to exercise in a pool. Get advice from a .    Date Last Reviewed: 4/1/2018 2000-2018 [email or web address]. [address] [place] [address], [place] [address]. All rights reserved. This information is not intended as a substitute for professional medical care. Always follow your healthcare professional's instructions.

## 2019-02-11 ENCOUNTER — VIRTUAL VISIT (OUTPATIENT)
Dept: FAMILY MEDICINE | Facility: OTHER | Age: 34
End: 2019-02-11

## 2019-02-11 NOTE — PROGRESS NOTES
"Date:   Clinician: Katelyn Morales  Clinician NPI: [id number]  Patient: Elodia Huber  Patient : 1985  Patient Address: [address] [place] [address]  Patient Phone: [phone]  Visit Protocol: URI  Patient Summary:  Elodia is a 33 year old ( : 1985 ) female who initiated a Visit for cold, sinus infection, or influenza. When asked the question \"Please sign me up to receive news, health information and promotions. \", Elodia responded \"No\".    Elodia states her symptoms started gradually 3-6 days ago.   Her symptoms consist of myalgia, enlarged lymph nodes, a headache, malaise, facial pain or pressure, a cough, rhinitis, tooth pain, and nasal congestion.   Symptom details     Nasal secretions: The color of her mucus is clear.    Cough: Elodia coughs every 5-10 minutes and her cough is more bothersome at night. Phlegm comes into her throat when she coughs. She believes the phlegm causes the cough. The color of the phlegm is green.     Facial pain or pressure: The facial pain or pressure feels worse when bending over or leaning forward.     Headache: She states the headache is moderate (4-6 on a 10 point pain scale).     Tooth pain: The tooth pain is not caused by a cavity, recent dental work, or other mouth problems.      Elodia denies having sore throat, ear pain, fever, chills, and wheezing. She also denies taking antibiotic medication for the symptoms, having recent facial or sinus surgery in the past 60 days, and double sickening (worsening symptoms after initial improvement). She is not experiencing dyspnea.   She has not recently been exposed to someone with influenza. Elodia has not been in close contact with any high risk individuals.   Weight: 250 lbs   Elodia does not smoke or use smokeless tobacco.   She denies pregnancy and denies breastfeeding. She has menstruated in the past month.   Additional information as reported by the patient (free text): I " would guess that I had a sinus infection lasting ten days over the New Year's holiday. I wasn't treated and it cleared up. I have very similar symptoms, but this feels worse. Particularly the hacking cough that isn't particularly effective. I also didn't have tooth pain or a headache earlier this year. Historically, I get a sinus infection yearly and usually need to seek treatment after ten days.   MEDICATIONS: Mononessa (28) oral, ALLERGIES: NKDA  Clinician Response:  Dear Elodia,  I am sorry you are not feeling well. To determine the most appropriate care for you, I would like you to be seen in person to further discuss your health history and symptoms.  You will not be charged for this Visit. Thank you for trusting us with your care.   Diagnosis: Refer for additional evaluation  Diagnosis ICD: R69  Diagnosis ICD: 462.0

## 2019-02-14 VITALS
DIASTOLIC BLOOD PRESSURE: 88 MMHG | TEMPERATURE: 98.1 F | BODY MASS INDEX: 40.74 KG/M2 | HEART RATE: 89 BPM | HEIGHT: 68 IN | WEIGHT: 268.8 LBS | SYSTOLIC BLOOD PRESSURE: 130 MMHG | OXYGEN SATURATION: 99 %

## 2019-02-24 DIAGNOSIS — Z30.49 ENCOUNTER FOR SURVEILLANCE OF OTHER CONTRACEPTIVE: ICD-10-CM

## 2019-02-25 NOTE — TELEPHONE ENCOUNTER
"Requested Prescriptions   Pending Prescriptions Disp Refills     ESTARYLLA 0.25-35 MG-MCG tablet [Pharmacy Med Name: ESTARYLLA TABLETS 28S] 84 tablet 0     Sig: TAKE 1 TABLET BY MOUTH DAILY    Contraceptives Protocol Passed - 2/24/2019  5:38 PM       Passed - Patient is not a current smoker if age is 35 or older       Passed - Recent (12 mo) or future (30 days) visit within the authorizing provider's specialty    Patient had office visit in the last 12 months or has a visit in the next 30 days with authorizing provider or within the authorizing provider's specialty.  See \"Patient Info\" tab in inbasket, or \"Choose Columns\" in Meds & Orders section of the refill encounter.         Last Written Prescription Date:  12/5/2018  Last Fill Quantity: 84,  # refills: 0   Last office visit: 12/13/2018 with prescribing provider:  IVETT Schulte  Future Office Visit:        Passed - Medication is active on med list       Passed - No active pregnancy on record       Passed - No positive pregnancy test in past 12 months          "

## 2019-02-27 RX ORDER — NORGESTIMATE AND ETHINYL ESTRADIOL 0.25-0.035
KIT ORAL
Qty: 84 TABLET | Refills: 2 | Status: SHIPPED | OUTPATIENT
Start: 2019-02-27 | End: 2019-11-05

## 2019-02-27 NOTE — TELEPHONE ENCOUNTER
Prescription approved per [facility] Refill Protocol.    Jana Milan RN -- [facility]

## 2019-11-05 ENCOUNTER — MYC MEDICAL ADVICE (OUTPATIENT)
Dept: PEDIATRICS | Facility: CLINIC | Age: 34
End: 2019-11-05

## 2020-01-28 ENCOUNTER — OFFICE VISIT (OUTPATIENT)
Dept: OBGYN | Facility: CLINIC | Age: 35
End: 2020-01-28
Payer: COMMERCIAL

## 2020-01-28 VITALS
SYSTOLIC BLOOD PRESSURE: 132 MMHG | DIASTOLIC BLOOD PRESSURE: 88 MMHG | WEIGHT: 271 LBS | HEIGHT: 68 IN | BODY MASS INDEX: 41.07 KG/M2

## 2020-01-28 DIAGNOSIS — Z01.419 WELL WOMAN EXAM WITH ROUTINE GYNECOLOGICAL EXAM: Primary | ICD-10-CM

## 2020-01-28 DIAGNOSIS — Z30.011 BCP (BIRTH CONTROL PILLS) INITIATION: ICD-10-CM

## 2020-01-28 DIAGNOSIS — Z30.09 BIRTH CONTROL COUNSELING: ICD-10-CM

## 2020-01-28 DIAGNOSIS — R03.0 ELEVATED BLOOD PRESSURE READING WITHOUT DIAGNOSIS OF HYPERTENSION: ICD-10-CM

## 2020-01-28 PROCEDURE — 99385 PREV VISIT NEW AGE 18-39: CPT | Performed by: ADVANCED PRACTICE MIDWIFE

## 2020-01-28 RX ORDER — NORGESTIMATE AND ETHINYL ESTRADIOL 0.25-0.035
1 KIT ORAL DAILY
Qty: 84 TABLET | Refills: 3 | Status: CANCELLED | OUTPATIENT
Start: 2020-01-28

## 2020-01-28 RX ORDER — ACETAMINOPHEN AND CODEINE PHOSPHATE 120; 12 MG/5ML; MG/5ML
0.35 SOLUTION ORAL DAILY
Qty: 84 TABLET | Refills: 3 | Status: SHIPPED | OUTPATIENT
Start: 2020-01-28 | End: 2021-01-04

## 2020-01-28 RX ORDER — LORATADINE 10 MG/1
10 TABLET ORAL DAILY
COMMUNITY

## 2020-01-28 ASSESSMENT — MIFFLIN-ST. JEOR: SCORE: 1973.78

## 2020-01-28 NOTE — PROGRESS NOTES
Elodia is a 34 year old female who presents for annual exam. No obstetric history on file.     Besides routine health maintenance, she would like to discuss her blood pressure.    HPI:  The patient's PCP was JASON Gallagher CNP, but states she needs to switch due to insurance reasons.    Blood pressure is elevated today, as has been in the past. Patient states that at a health fair she was told she had hypertension and was encouraged to change diet and exercise to reduce risk. Pt states she has been working very hard with diet and exercise - eats mainly vegetables and no red meat, and exercises a half hour every day or an hour every other day on her exercise bike.  Though she has tried these lifestyle modifications, BP remains slightly elevated today.       GYNECOLOGIC HISTORY:    No LMP recorded.    Regular menses? yes  Menses every 28 days.  Length of menses: 5 days    Her current contraception method is: oral contraceptives.  She  reports that she quit smoking about 4 years ago. Her smoking use included cigarettes. She has never used smokeless tobacco.    Patient is sexually active.  STD testing offered?  Declined  Last PHQ-9 score on record = No flowsheet data found.  Last GAD7 score on record = No flowsheet data found.  Alcohol Score = 2x/week    HEALTH MAINTENANCE:  Cholesterol: (  Cholesterol   Date Value Ref Range Status   12/13/2018 163 <200 mg/dL Final   09/25/2013 167 0 - 200 mg/dL Final     Comment:     LDL Cholesterol is the primary guide to therapy.   The NCEP recommends further evaluation of: patients with cholesterol greater   than 200 mg/dL if additional risk factors are present, cholesterol greater   than   240 mg/dL, triglycerides greater than 150 mg/dL, or HDL less than 40 mg/dL.      Last Mammo: Not applicable, Result: Not applicable, Next Mammo: Due at age 40  Pap: (  Lab Results   Component Value Date    PAP NIL 10/06/2017    PAP NIL 12/24/2014    PAP NIL 05/02/2012    )      Health maintenance updated:  yes    HISTORY:  OB History   No obstetric history on file.       Patient Active Problem List   Diagnosis     CARDIOVASCULAR SCREENING; LDL GOAL LESS THAN 160     Eczema     Obesity (BMI 30-39.9)     Contraceptive management     Cystic acne     Milk intolerance     Thyroid goiter     Vitamin D deficiency     Morbid obesity (H)     Obesity, Class III, BMI 40-49.9 (morbid obesity) (H)     Past Surgical History:   Procedure Laterality Date     TONSILLECTOMY ADULT  2010      Social History     Tobacco Use     Smoking status: Former Smoker     Types: Cigarettes     Last attempt to quit: 2015     Years since quittin.6     Smokeless tobacco: Never Used   Substance Use Topics     Alcohol use: Yes     Alcohol/week: 0.0 standard drinks     Comment: 1 time per week, 2 per time      Problem (# of Occurrences) Relation (Name,Age of Onset)    Cancer (2) Paternal Grandmother: breast, Maternal Grandfather: unknown    Cerebrovascular Disease (1) Father (52)    Hypertension (1) Father            Current Outpatient Medications   Medication Sig     ESTARYLLA 0.25-35 MG-MCG tablet TAKE 1 TABLET BY MOUTH DAILY     No current facility-administered medications for this visit.      Allergies   Allergen Reactions     No Known Drug Allergies        Past medical, surgical, social and family histories were reviewed and updated in EPIC.    ROS:   12 point review of systems negative other than symptoms noted below or in the HPI.  Breast: Tenderness  No urinary frequency or dysuria, bladder or kidney problems, Normal menstrual cycles    EXAM:  There were no vitals taken for this visit.   BMI: There is no height or weight on file to calculate BMI.    PHYSICAL EXAM:  Constitutional:   Appearance: Well nourished, well developed, alert, in no acute distress  Neck:  Lymph Nodes:  No lymphadenopathy present    Thyroid:  Gland size normal, nontender, no nodules or masses present  on palpation  Chest:  Respiratory  Effort:  Breathing unlabored  Cardiovascular:    Heart: Auscultation:  Regular rate, normal rhythm, no murmurs present  Breasts: Inspection of Breasts:  No lymphadenopathy present., Palpation of Breasts and Axillae:  No masses present on palpation. Axillary Lymph Nodes:  No lymphadenopathy present. and No nodularity, asymmetry or nipple discharge bilaterally. Pt reports slight breast tenderness likely due to upcoming menses.  Gastrointestinal:   Abdominal Examination:  Abdomen nontender to palpation, tone normal without rigidity or guarding, no masses present, umbilicus without lesions   Liver and Spleen:  No hepatomegaly present, liver nontender to palpation    Hernias:  No hernias present  Lymphatic: Lymph Nodes:  No other lymphadenopathy present  Skin:  General Inspection:  No rashes present, no lesions present, no areas of  discoloration  Neurologic:    Mental Status:  Oriented X3.  Normal strength and tone, sensory exam grossly normal, mentation intact and speech normal.    Psychiatric:   Mentation appears normal and affect normal/bright.         Pelvic Exam:  External Genitalia:     Normal appearance for age, no discharge present, no tenderness present, no inflammatory lesions present, color normal  Vagina:     Normal vaginal vault without central or paravaginal defects, no discharge present, no inflammatory lesions present, no masses present  Bladder:     Nontender to palpation  Urethra:   Urethral Body:  Urethra palpation normal, urethra structural support normal   Urethral Meatus:  No erythema or lesions present  Cervix:     Appearance healthy, no lesions present, nontender to palpation, no bleeding present  Uterus:     Uterus: firm, normal sized and nontender, anteverted in position.   Adnexa:     No adnexal tenderness present, no adnexal masses present  Perineum:     Perineum within normal limits, no evidence of trauma, no rashes or skin lesions present  Anus:     Anus within normal limits, no hemorrhoids  present  Inguinal Lymph Nodes:     No lymphadenopathy present  Pubic Hair:     Normal pubic hair distribution for age  Genitalia and Groin:     No rashes present, no lesions present, no areas of discoloration, no masses present      COUNSELING:   Reviewed preventive health counseling, as reflected in patient instructions       Regular exercise       Healthy diet/nutrition       Contraception    BMI: There is no height or weight on file to calculate BMI.  Weight management plan: Discussed healthy diet and exercise guidelines    ASSESSMENT:  34 year old female with satisfactory annual exam.  Encounter Diagnoses   Name Primary?     Encounter for surveillance of other contraceptive      Well woman exam with routine gynecological exam Yes     Cervical cancer screening      Special screening examination for human papillomavirus (HPV)      BCP (birth control pills) initiation        PLAN:  (Z01.419) Well woman exam with routine gynecological exam  (primary encounter diagnosis)    (Z30.011) BCP (birth control pills) initiation  Plan: norethindrone (MICRONOR) 0.35 MG tablet        See notes below     (Z30.09) Birth control counseling  Plan:   Discussed elevated blood pressure, BMI, and family history of stroke with estrogen use in OCPs, plus given patients age is no longer a good candidate for COCs.   Handouts given on contraceptive options, counseled on birth control options, including IUDs (hormonal and non-hormonal), nexplanon, diaphram, cervical cap, and progesterone only pill.  Pt elected to start progesterone only pill at this time.     (R03.0) Elevated blood pressure reading without diagnosis of hypertension  Comment: discussed lifestyle modifications, hypertension diet and exercise   Patient is exercising 1 hour a day three times a week  Patient does not eat meat, states primary food intake are vegetables   Deepak Will follow-up with PCP regarding recurrent elevated blood pressure >130s over >80s  Has cholesterol  checked at work states this has been normal range     Hemalatha Topete, DNP, APRN, CNM, IBCLC

## 2020-01-28 NOTE — NURSING NOTE
"Chief Complaint   Patient presents with     Physical       Initial /88 (BP Location: Right arm, Cuff Size: Adult Large)   Ht 1.721 m (5' 7.75\")   Wt 122.9 kg (271 lb)   LMP 01/03/2020 (Approximate)   Breastfeeding No   BMI 41.51 kg/m   Estimated body mass index is 41.51 kg/m  as calculated from the following:    Height as of this encounter: 1.721 m (5' 7.75\").    Weight as of this encounter: 122.9 kg (271 lb).  BP completed using cuff size: large    Questioned patient about current smoking habits.  Pt. has never smoked.      No obstetric history on file.    The following HM Due: NONE    Curly Sanchez CMA        "

## 2020-02-04 DIAGNOSIS — Z30.49 ENCOUNTER FOR SURVEILLANCE OF OTHER CONTRACEPTIVE: ICD-10-CM

## 2020-02-04 RX ORDER — NORGESTIMATE AND ETHINYL ESTRADIOL 0.25-0.035
KIT ORAL
Qty: 28 TABLET | Refills: 0 | Status: SHIPPED | OUTPATIENT
Start: 2020-02-04 | End: 2021-01-04

## 2020-02-04 NOTE — TELEPHONE ENCOUNTER
"Requested Prescriptions   Pending Prescriptions Disp Refills     ESTARYLLA 0.25-35 MG-MCG tablet [Pharmacy Med Name: ESTARYLLA TABLETS 28S] 84 tablet 0     Sig: TAKE 1 TABLET BY MOUTH DAILY   Last Written Prescription Date:  11/5/2019  Last Fill Quantity: 84 tablet,  # refills: 3   Last office visit: 12/13/2018 with prescribing provider:  IVETT Schulte   Future Office Visit:        Contraceptives Protocol Failed - 2/4/2020  2:02 PM        Failed - Recent (12 mo) or future (30 days) visit within the authorizing provider's specialty     Patient has had an office visit with the authorizing provider or a provider within the authorizing providers department within the previous 12 mos or has a future within next 30 days. See \"Patient Info\" tab in inbasket, or \"Choose Columns\" in Meds & Orders section of the refill encounter.              Passed - Patient is not a current smoker if age is 35 or older        Passed - Medication is active on med list        Passed - No active pregnancy on record        Passed - No positive pregnancy test in past 12 months          "

## 2020-03-02 ENCOUNTER — HEALTH MAINTENANCE LETTER (OUTPATIENT)
Age: 35
End: 2020-03-02

## 2020-12-14 ENCOUNTER — HEALTH MAINTENANCE LETTER (OUTPATIENT)
Age: 35
End: 2020-12-14

## 2020-12-30 DIAGNOSIS — Z30.011 BCP (BIRTH CONTROL PILLS) INITIATION: ICD-10-CM

## 2020-12-31 ENCOUNTER — TELEPHONE (OUTPATIENT)
Dept: PEDIATRICS | Facility: CLINIC | Age: 35
End: 2020-12-31

## 2020-12-31 RX ORDER — ACETAMINOPHEN AND CODEINE PHOSPHATE 120; 12 MG/5ML; MG/5ML
0.35 SOLUTION ORAL DAILY
Qty: 84 TABLET | Refills: 3 | OUTPATIENT
Start: 2020-12-31

## 2020-12-31 NOTE — TELEPHONE ENCOUNTER
Reason for call:  Other   Patient called regarding (reason for call): call back  Additional comments: patient needs birth control refill/ couple days left/ would like clinic to call and schedule no openings with Dr. Junior within the next couple days.     Phone number to reach patient:  Home number on file [phone] (home)    Best Time:  n/a    Can we leave a detailed message on this number?  YES    Travel screening: Not Applicable

## 2021-01-04 ENCOUNTER — VIRTUAL VISIT (OUTPATIENT)
Dept: PEDIATRICS | Facility: CLINIC | Age: 36
End: 2021-01-04
Payer: COMMERCIAL

## 2021-01-04 ENCOUNTER — MYC MEDICAL ADVICE (OUTPATIENT)
Dept: PEDIATRICS | Facility: CLINIC | Age: 36
End: 2021-01-04

## 2021-01-04 DIAGNOSIS — Z30.40 ENCOUNTER FOR REFILL OF PRESCRIPTION FOR CONTRACEPTION: Primary | ICD-10-CM

## 2021-01-04 DIAGNOSIS — R63.4 LOSS OF WEIGHT: ICD-10-CM

## 2021-01-04 DIAGNOSIS — E66.01 MORBID OBESITY (H): ICD-10-CM

## 2021-01-04 PROCEDURE — 99213 OFFICE O/P EST LOW 20 MIN: CPT | Mod: 95 | Performed by: NURSE PRACTITIONER

## 2021-01-04 RX ORDER — ACETAMINOPHEN AND CODEINE PHOSPHATE 120; 12 MG/5ML; MG/5ML
SOLUTION ORAL
COMMUNITY
End: 2021-01-04

## 2021-01-04 RX ORDER — ACETAMINOPHEN AND CODEINE PHOSPHATE 120; 12 MG/5ML; MG/5ML
0.35 SOLUTION ORAL DAILY
Qty: 28 TABLET | Refills: 5 | Status: SHIPPED | OUTPATIENT
Start: 2021-01-04 | End: 2021-06-16

## 2021-01-04 NOTE — PROGRESS NOTES
Elodia Huber is a 35 year old female who is being evaluated via a billable video visit.      How would you like to obtain your AVS? MyChart  If the video visit is dropped, the invitation should be resent by: Text to cell phone: .  Will anyone else be joining your video visit? No      Video Start Time: 5:40p      Subjective     Elodia Huber is a 35 year old female who presents to clinic today for the following health issues:  History of Present Illness       She eats 4 or more servings of fruits and vegetables daily.She consumes 0 sweetened beverage(s) daily.She exercises with enough effort to increase her heart rate 30 to 60 minutes per day.  She exercises with enough effort to increase her heart rate 3 or less days per week.   She is taking medications regularly.         Medication Followup of Norlyda birthcontrol     Taking Medication as prescribed: yes    Side Effects:  Facial hair     Medication Helping Symptoms:  yes     Oral contracpetion  -Here for reill of OCP  -States medication is working well for patient  -Minimal side effect of unwanted facial hair    States she has lost weight in 2020.  Currently 245lbs.  Her goal is 200lbs.  Wt Readings from Last 2 Encounters:   01/28/20 122.9 kg (271 lb)   12/13/18 121.9 kg (268 lb 12.8 oz)       Review of Systems   Constitutional, HEENT, cardiovascular, pulmonary, gi and gu systems are negative, except as otherwise noted.      Objective           Vitals:  No vitals were obtained today due to virtual visit.    Physical Exam   GENERAL: Healthy, alert and no distress  EYES: Eyes grossly normal to inspection.  No discharge or erythema, or obvious scleral/conjunctival abnormalities.  RESP: No audible wheeze, cough, or visible cyanosis.  No visible retractions or increased work of breathing.    SKIN: Visible skin clear. No significant rash, abnormal pigmentation or lesions.  NEURO: Cranial nerves grossly intact.  Mentation and speech appropriate for age.  PSYCH:  "Mentation appears normal, affect normal/bright, judgement and insight intact, normal speech and appearance well-groomed.    Assessment & Plan     1. Encounter for refill of prescription for contraception  Stable  Refilled medication  Follow-up in 6 months of sooner if needed  - norethindrone (NORLYDA) 0.35 MG tablet; Take 1 tablet (0.35 mg) by mouth daily  Dispense: 28 tablet; Refill: 5    2. Morbid obesity (H)  Continue with diet and exercise    3. Loss of weight  Continue with diet and exercise    10 minutes spent on the date of the encounter doing chart review, history and exam, documentation and further activities as noted above         BMI:   Estimated body mass index is 41.51 kg/m  as calculated from the following:    Height as of 1/28/20: 1.721 m (5' 7.75\").    Weight as of 1/28/20: 122.9 kg (271 lb).   Weight management plan: Discussed healthy diet and exercise guidelines      See Patient Instructions    No follow-ups on file.    Katelyn Ma NP  [facility]          Video-Visit Details    Type of service:  Video Visit    Video End Time:5:50p    Originating Location (pt. Location): Home    Distant Location (provider location):  [facility]     Platform used for Video Visit: Marianne  "

## 2021-01-04 NOTE — TELEPHONE ENCOUNTER
"Pt scheduled to see alt provider today.      - Bear \"Js\" ROX Hudson - Patient Advocate Liason (PAL)  [facility]    "

## 2021-01-06 NOTE — PATIENT INSTRUCTIONS
Jai Clifton,    It was nice talking with you!  Your immunizations have been updated in our records.  I have refilled your OCP.    Please let me know if you have any questions,    Katelyn Ma, VERENICE  Family Medicine

## 2021-01-19 ENCOUNTER — MYC MEDICAL ADVICE (OUTPATIENT)
Dept: PEDIATRICS | Facility: CLINIC | Age: 36
End: 2021-01-19

## 2021-01-20 ENCOUNTER — OFFICE VISIT (OUTPATIENT)
Dept: OPTOMETRY | Facility: CLINIC | Age: 36
End: 2021-01-20
Payer: COMMERCIAL

## 2021-01-20 DIAGNOSIS — H15.122 NODULAR EPISCLERITIS OF LEFT EYE: Primary | ICD-10-CM

## 2021-01-20 DIAGNOSIS — H04.129 DRY EYE: ICD-10-CM

## 2021-01-20 PROCEDURE — 99203 OFFICE O/P NEW LOW 30 MIN: CPT | Performed by: OPTOMETRIST

## 2021-01-20 RX ORDER — TOBRAMYCIN AND DEXAMETHASONE 3; 1 MG/ML; MG/ML
1 SUSPENSION/ DROPS OPHTHALMIC 4 TIMES DAILY
Qty: 5 ML | Refills: 0 | Status: SHIPPED | OUTPATIENT
Start: 2021-01-20 | End: 2021-01-21

## 2021-01-20 ASSESSMENT — TONOMETRY
OS_IOP_MMHG: 14
OD_IOP_MMHG: 14
IOP_METHOD: APPLANATION

## 2021-01-20 ASSESSMENT — VISUAL ACUITY
OS_PH_SC: 20/20
OS_SC: 20/25
METHOD: SNELLEN - LINEAR
OS_SC+: -2

## 2021-01-20 ASSESSMENT — EXTERNAL EXAM - RIGHT EYE: OD_EXAM: NORMAL

## 2021-01-20 ASSESSMENT — EXTERNAL EXAM - LEFT EYE: OS_EXAM: NORMAL

## 2021-01-20 ASSESSMENT — SLIT LAMP EXAM - LIDS
COMMENTS: THIN STRIP
COMMENTS: THIN STRIP

## 2021-01-20 NOTE — PROGRESS NOTES
"Chief Complaint   Patient presents with     Foreign Body in Eye       Do you wear contact lenses? No    HPI    Foreign Body in Eye      In right eye. Duration of 5 days. Associated symptoms include redness, eye pain, lid swelling, discharge, and tearing. Since onset it is stable. Treatments tried include irrigation. Response to treatment was no improvement.   Comments      Got something in eye at some point on Friday  Flushed Tuesday  Has been tender, swollen lids, \"leaky eyes\"  Slight pain with movement of eyes  Feels more dry than usual             Yamila Salgado CPO    See Review Of Systems   No medication changes  No eye drops used , no artificial tears    Works on computer all day  Eczema      Medical, surgical and family histories reviewed and updated 1/20/2021.         OBJECTIVE: See Ophthalmology exam    ASSESSMENT:    ICD-10-CM    1. Nodular episcleritis of left eye  H15.122 tobramycin-dexamethasone (TOBRADEX) 0.3-0.1 % ophthalmic suspension   2. Dry eye  H04.129     foreign body sensation secondary to nodule    PLAN:  tobradex four times daily or maxitrol four times daily for one week, may taper to two times daily after a few days and discontinue   Artificial tears as needed may use in combination waiting at least 10 min between drops  Shake prescription well       Clover Jesus OD   "

## 2021-01-20 NOTE — LETTER
"    1/20/2021         RE: Elodia Huber  [facility] [place] [address]        Dear Colleague,    Thank you for referring your patient, Elodia Huber, to the [facility]. Please see a copy of my visit note below.    Chief Complaint   Patient presents with     Foreign Body in Eye       Do you wear contact lenses? No    HPI    Foreign Body in Eye      In right eye. Duration of 5 days. Associated symptoms include redness, eye pain, lid swelling, discharge, and tearing. Since onset it is stable. Treatments tried include irrigation. Response to treatment was no improvement.   Comments      Got something in eye at some point on Friday  Flushed Tuesday  Has been tender, swollen lids, \"leaky eyes\"  Slight pain with movement of eyes  Feels more dry than usual             Yamila Salgado CPO    See Review Of Systems   No medication changes  No eye drops used , no artificial tears    Works on computer all day  Eczema      Medical, surgical and family histories reviewed and updated 1/20/2021.         OBJECTIVE: See Ophthalmology exam    ASSESSMENT:    ICD-10-CM    1. Nodular episcleritis of left eye  H15.122 tobramycin-dexamethasone (TOBRADEX) 0.3-0.1 % ophthalmic suspension   2. Dry eye  H04.129     foreign body sensation secondary to nodule    PLAN:  tobradex four times daily or maxitrol four times daily for one week, may taper to two times daily after a few days and discontinue   Artificial tears as needed may use in combination waiting at least 10 min between drops  Shake prescription well       Clover Jesus OD       Again, thank you for allowing me to participate in the care of your patient.        Sincerely,        Clover Jesus, OD    "

## 2021-01-20 NOTE — PATIENT INSTRUCTIONS
DRY EYE TREATMENT    I recommend using artificial tears for your dry eye. There are over the counter drops that work well and may be used up to 4x daily. ( systane balance or ultra, blink, refresh optive, soothe xp) stay away from any red eye relief products such as visine and clear eyes.     If you need more than 4 drops daily, use a preservative free product which come in individual vials and may be used for 24 hours and discarded.   The more severe the dry eye, the more frequent instillation of artificial tears  that are needed to reduce irritation/ inflammation. (Sometimes every 1-2 hours for a couple of days).  You can also add a lubricating ointment in the lower lid at bedtime. ( over the counter refresh pm, genteal gel, lacrilube etc.)    Artificial tears work best as a preventative and not as well after your eyes are starting to bother you and/ or are red.  It may take 4- 6 weeks of using the drops before you notice improvement.  If after that time you are still having problems schedule an appointment for an evaluation to discuss different treatments.    Dry eyes are a chronic condition and you may have more symptoms at certain times of the year.      Additional recommended treatment:  Warm compresses once to twice daily for 5-10 minutes    Directions for warm soaks  There are few methods for hot compresses. Moisten a washcloth with hot water, or microwave for 10 seconds, being careful to not get the cloth too hot.   Then put the washcloth onto your eyelids for 5 minutes. It will cool quickly so a rice pack or eyemask that can be heated and laid on top of the washcloth will help retain the heat.      Overabundance of bacterial microorganisms along the eyelashes and lid margins induce stress on the tear film and promote inflammation.  Regular lid hygiene helps diminish the bacterial population to prevent inflammation and infection.  Use a warm compress to loosen crusts   Cleanse lids once daily with a lid  cleansing product as directed such as Ocusoft or Sterilid which can be purchased at most pharmacies. Diluted baby shampoo will also work, but not as well as it dries the skin and can irritate eyelids.  Hypo chlor spray may also be used on closed lids.      Omega 3 fatty acid supplements taken 1-2x daily  Recommend  at least  2000mg omega 3  800 EPA  600 DHA    Blink regularly  Stay hydrated  Increase humidity  Wear sunglasses   Avoid direct exposure to forced air--turn air vents in the car away and keep fans from blowing directly on your face

## 2021-01-21 ENCOUNTER — MYC MEDICAL ADVICE (OUTPATIENT)
Dept: OPTOMETRY | Facility: CLINIC | Age: 36
End: 2021-01-21

## 2021-01-21 RX ORDER — TOBRAMYCIN AND DEXAMETHASONE 3; 1 MG/ML; MG/ML
1 SUSPENSION/ DROPS OPHTHALMIC 4 TIMES DAILY
Qty: 5 ML | Refills: 0 | Status: SHIPPED | OUTPATIENT
Start: 2021-01-21 | End: 2023-02-09

## 2021-04-18 ENCOUNTER — HEALTH MAINTENANCE LETTER (OUTPATIENT)
Age: 36
End: 2021-04-18

## 2021-06-16 ENCOUNTER — NURSE TRIAGE (OUTPATIENT)
Dept: NURSING | Facility: CLINIC | Age: 36
End: 2021-06-16

## 2021-06-16 DIAGNOSIS — Z30.40 ENCOUNTER FOR REFILL OF PRESCRIPTION FOR CONTRACEPTION: ICD-10-CM

## 2021-06-16 RX ORDER — ACETAMINOPHEN AND CODEINE PHOSPHATE 120; 12 MG/5ML; MG/5ML
0.35 SOLUTION ORAL DAILY
Qty: 84 TABLET | Refills: 0 | Status: SHIPPED | OUTPATIENT
Start: 2021-06-16 | End: 2021-09-14

## 2021-06-16 NOTE — TELEPHONE ENCOUNTER
Routing to PCP. She is looking for refill of OCP. Has physicals in November. Please see triage note below for details. 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 Melany Rodriguez RN on 6/16/2021 at 2:13 PM

## 2021-06-16 NOTE — TELEPHONE ENCOUNTER
Triage call:   Patient is calling about her birth control- states she normally gets the script filled for a year- has her physicals in November. Last visit was a virtual visit.      Disp Refills Start End VINEET   norethindrone (NORLYDA) 0.35 MG tablet 28 tablet 5 1/4/2021  No   Sig - Route: Take 1 tablet (0.35 mg) by mouth daily - Oral   Sent to pharmacy as: Norethindrone 0.35 MG Oral Tablet (Norlyda)   Class: E-Prescribe   Order: [id number]   E-Prescribing Status: Receipt confirmed by pharmacy (1/4/2021  5:48 PM CST)     Patient is requesting a refill and a 90 day of birthcontrol - states she will be in Nov for her next check. 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     Chart review did indicate a 6 month follow up but patient is not sure why this would be required.     PCP please review and advise.     Liza Lozada RN BSN 6/16/2021 12:51 PM         Reason for Disposition    Caller has NON-URGENT medication question about med that PCP prescribed and triager unable to answer question    Additional Information    Negative: Drug overdose and triager unable to answer question    Negative: Caller requesting information unrelated to medicine    Negative: Caller requesting a prescription for Strep throat and has a positive culture result    Negative: Rash while taking a medication or within 3 days of stopping it    Negative: Immunization reaction suspected    Negative: Asthma and having symptoms of asthma (cough, wheezing, etc.)    Negative: Breastfeeding questions about mother's medicines and diet    Negative: MORE THAN A DOUBLE DOSE of a prescription or over-the-counter (OTC) drug    Negative: DOUBLE DOSE (an extra dose or lesser amount) of over-the-counter (OTC) drug and any symptoms (e.g., dizziness, nausea, pain, sleepiness)    Negative: DOUBLE DOSE (an extra dose or lesser amount) of prescription  drug and any symptoms (e.g., dizziness, nausea, pain, sleepiness)    Negative: Took another person's prescription drug    Negative: DOUBLE DOSE (an extra dose or lesser amount) of prescription drug and NO symptoms (Exception: a double dose of antibiotics)    Negative: Diabetes drug error or overdose (e.g., took wrong type of insulin or took extra dose)    Negative: Caller has medication question about med not prescribed by PCP and triager unable to answer question (e.g., compatibility with other med, storage)    Negative: Request for URGENT new prescription or refill of 'essential' medication (i.e., likelihood of harm to patient if not taken) and triager unable to fill per department policy    Negative: Prescription not at pharmacy and was prescribed today by PCP    Negative: Pharmacy calling with prescription questions and triager unable to answer question    Negative: Caller has urgent medication question about med that PCP prescribed and triager unable to answer question    Protocols used: MEDICATION QUESTION CALL-A-OH

## 2021-09-12 DIAGNOSIS — Z30.40 ENCOUNTER FOR REFILL OF PRESCRIPTION FOR CONTRACEPTION: ICD-10-CM

## 2021-09-13 ENCOUNTER — MYC REFILL (OUTPATIENT)
Dept: PEDIATRICS | Facility: CLINIC | Age: 36
End: 2021-09-13

## 2021-09-13 DIAGNOSIS — Z30.40 ENCOUNTER FOR REFILL OF PRESCRIPTION FOR CONTRACEPTION: ICD-10-CM

## 2021-09-13 RX ORDER — ACETAMINOPHEN AND CODEINE PHOSPHATE 120; 12 MG/5ML; MG/5ML
0.35 SOLUTION ORAL DAILY
Qty: 84 TABLET | Refills: 0 | Status: CANCELLED | OUTPATIENT
Start: 2021-09-13

## 2021-09-14 RX ORDER — NORETHINDRONE
KIT
Qty: 84 TABLET | Refills: 0 | Status: SHIPPED | OUTPATIENT
Start: 2021-09-14 | End: 2021-12-03

## 2021-09-14 NOTE — TELEPHONE ENCOUNTER
Prescription approved per [facility] Refill Protocol.    Anabell Beltran RN on 9/14/2021 at 11:12 AM

## 2021-09-15 NOTE — TELEPHONE ENCOUNTER
Sent earlier, sent mychart response  Christine Thomson RN, BSN  Message handled by CLINIC NURSE.

## 2021-10-02 ENCOUNTER — HEALTH MAINTENANCE LETTER (OUTPATIENT)
Age: 36
End: 2021-10-02

## 2021-12-03 ENCOUNTER — OFFICE VISIT (OUTPATIENT)
Dept: MIDWIFE SERVICES | Facility: CLINIC | Age: 36
End: 2021-12-03
Payer: COMMERCIAL

## 2021-12-03 VITALS
OXYGEN SATURATION: 100 % | DIASTOLIC BLOOD PRESSURE: 80 MMHG | SYSTOLIC BLOOD PRESSURE: 130 MMHG | BODY MASS INDEX: 39.73 KG/M2 | HEART RATE: 84 BPM | WEIGHT: 259.4 LBS

## 2021-12-03 DIAGNOSIS — Z01.419 ENCOUNTER FOR GYNECOLOGICAL EXAMINATION WITHOUT ABNORMAL FINDING: Primary | ICD-10-CM

## 2021-12-03 DIAGNOSIS — E04.9 THYROID GOITER: ICD-10-CM

## 2021-12-03 DIAGNOSIS — Z30.40 ENCOUNTER FOR REFILL OF PRESCRIPTION FOR CONTRACEPTION: ICD-10-CM

## 2021-12-03 PROCEDURE — 84443 ASSAY THYROID STIM HORMONE: CPT | Performed by: ADVANCED PRACTICE MIDWIFE

## 2021-12-03 PROCEDURE — 36415 COLL VENOUS BLD VENIPUNCTURE: CPT | Performed by: ADVANCED PRACTICE MIDWIFE

## 2021-12-03 PROCEDURE — 99395 PREV VISIT EST AGE 18-39: CPT | Performed by: ADVANCED PRACTICE MIDWIFE

## 2021-12-03 RX ORDER — ACETAMINOPHEN AND CODEINE PHOSPHATE 120; 12 MG/5ML; MG/5ML
SOLUTION ORAL
Qty: 84 TABLET | Refills: 3 | Status: SHIPPED | OUTPATIENT
Start: 2021-12-03 | End: 2022-11-11

## 2021-12-03 NOTE — PROGRESS NOTES
CC/HPI: Elodia Huber is a 36 year old year old female  who presents today for her annual exam. She is currently using oral contraceptive  and would like to continue with this method of birth control. She has not had any side effects and likes her current medications. She sets a timer on her phone and take it at the same time every day.    Specific concerns today include: she has felt like she has a pimple on her right labia. No pain, no redness, no heat. No discharge. No other vaginal symptoms.    Pap Hx per pt report never had an abnormal pap. Testing every 5 years.   STI screening: declines    Menstrual History  Menses every 21-28 days.  Length of menses: 4 days  Menstrual description: normal    Sexuality: Satisfied with intimate relationship    Patient lives with her partner, feels safe in her home and relationships.  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  Exercise: getting 10,000 steps a day at work, does not have any other scheduled exercise. Discussed trying to incorporate aerobic exercise, 150 minutes per week.  Patient's BMI is Body mass index is 39.73 kg/m . and she is not satisfied with her current weight. Her goal weight is 200 lbs. She did Noom and feels like that was very beneficial to her relationship with food. She has been losing weight and is happy with that.    HISTORIES:  Patient Active Problem List   Diagnosis     CARDIOVASCULAR SCREENING; LDL GOAL LESS THAN 160     Eczema     Obesity (BMI 30-39.9)     Contraceptive management     Cystic acne     Milk intolerance     Thyroid goiter     Vitamin D deficiency     Morbid obesity (H)     Obesity, Class III, BMI 40-49.9 (morbid obesity) (H)     Past Medical History:   Diagnosis Date     Obesity, Class III, BMI 40-49.9 (morbid obesity) (H)      Thyroid goiter      Uses birth control      Past Surgical History:   Procedure Laterality Date     TONSILLECTOMY ADULT  2010      Current Outpatient Medications   Medication Sig Dispense Refill     loratadine (CLARITIN) 10 MG tablet Take 10 mg by mouth daily       norethindrone (NORLYDA) 0.35 MG tablet TAKE 1 TABLET(0.35 MG) BY MOUTH DAILY 84 tablet 3     tobramycin-dexamethasone (TOBRADEX) 0.3-0.1 % ophthalmic suspension Place 1 drop Into the left eye 4 times daily 5 mL 0     Allergies   Allergen Reactions     No Known Drug Allergies      Social History     Socioeconomic History     Marital status: Single     Spouse name: Not on file     Number of children: Not on file     Years of education: Not on file     Highest education level: Not on file   Occupational History     Not on file   Tobacco Use     Smoking status: Former Smoker     Types: Cigarettes     Quit date: 2015     Years since quittin.5     Smokeless tobacco: Never Used   Substance and Sexual Activity     Alcohol use: Yes     Alcohol/week: 0.0 standard drinks     Comment: 1 time per week, 2 per time     Drug use: No     Sexual activity: Yes     Partners: Male     Birth control/protection: Pill   Other Topics Concern     Parent/sibling w/ CABG, MI or angioplasty before 65F 55M? Not Asked   Social History Narrative     Not on file     Social Determinants of Health     Financial Resource Strain: Not on file   Food Insecurity: Not on file   Transportation Needs: Not on file   Physical Activity: Not on file   Stress: Not on file   Social Connections: Not on file   Intimate Partner Violence: Not on file   Housing Stability: Not on file     Family History   Problem Relation Age of Onset     No Known Problems Mother      Cerebrovascular Disease Father 52     Hypertension Father      Cancer Maternal Grandfather         unknown     Cancer Paternal Grandmother         breast          HEALTH MAINTENANCE:  Lab Results   Component Value Date    CHOL 163 2018     Lab Results   Component Value Date    HDL 57 2018     Lab Results   Component Value Date    TSH 0.96 10/06/2017      Health Maintenance   Topic Date Due     ADVANCE CARE PLANNING  Never done     HIV SCREENING  Never done     HEPATITIS C SCREENING  Never done     DTAP/TDAP/TD IMMUNIZATION (1 - Tdap) Never done     INFLUENZA VACCINE (1) 09/01/2021     COVID-19 Vaccine (3 - Booster for Moderna series) 09/24/2021     HPV TEST  10/06/2022     PAP  10/06/2022     PREVENTIVE CARE VISIT  12/03/2022     PHQ-2  Completed     Pneumococcal Vaccine: Pediatrics (0 to 5 Years) and At-Risk Patients (6 to 64 Years)  Aged Out     IPV IMMUNIZATION  Aged Out     MENINGITIS IMMUNIZATION  Aged Out     HEPATITIS B IMMUNIZATION  Aged Out       Review Of Systems:  C: NEGATIVE for fever, chills, change in weight  I: NEGATIVE for worrisome rashes, moles or lesions  E: NEGATIVE for vision changes or irritation  E/M: NEGATIVE for ear, mouth and throat problems  R: NEGATIVE for significant cough or SOB  B: NEGATIVE for masses, tenderness or discharge  CV: NEGATIVE for chest pain, palpitations or peripheral edema  GI: NEGATIVE for nausea, abdominal pain, heartburn, or change in bowel habits  : NEGATIVE for frequency, dysuria, or hematuria  E: NEGATIVE for temperature intolerance, skin/hair changes      EXAM:  /80 (BP Location: Right arm, Patient Position: Right side, Cuff Size: Adult Large)   Pulse 84   Wt 117.7 kg (259 lb 6.4 oz)   LMP 11/19/2021 (Exact Date)   SpO2 100%   BMI 39.73 kg/m    General - pleasant female in no acute distress.  Skin - no suspicious lesions or rashes  EENT-  PERRLA, tympanic membranes intact and pearly gray, euthyroid with out palpable nodules  Neck - supple without lymphadenopathy.  Lungs - clear to auscultation bilaterally.  Heart - regular rate and rhythm without murmur.  Breasts- symmetrical . No dominant fixed or suspicious masses noted.  No skin or nipple changes or axillary nodes. Self exam is discussed.  Abdomen - soft, nontender, nondistended, no masses or organomegaly noted.  Musculoskeletal - no gross  deformities.  Neurological - normal strength, sensation, and mental status.  Pelvic - EG: normal  female, vulva reveals no erythema or lesions.   Adnexa: no masses or tenderness.  Anus- normal, no lesions.  Rectovaginal - deferred.      ASSESSMENT/PLAN  (E04.9) Thyroid goiter  (primary encounter diagnosis)  Comment: Has had a history of thyroid goiter for a long time, since late teens. No history of abnormal thyroid. Does not have establish follow up plan for that. Recommend checking thyroid every few years and more frequently if getting any asymptomatic abnormal thyroid labs. Endocrine referral as needed.   Plan: TSH with free T4 reflex    (Z30.40) Encounter for refill of prescription for contraception  Plan: norethindrone (NORLYDA) 0.35 MG tablet    (Z01.419) Encounter for gynecological examination without abnormal finding  (primary encounter diagnosis)  Plan: Follow up for annual exams or PRN.    Additional teaching done at this visit regarding exercise, birth control and weight/diet               I have discussed with patient the harms and  benefits of the prescriptions,    medications, treatment options.     RTC in one year for annul exam or sooner with concerns    Acacia Frost, VERENICE-FNP student    I was present with the CNM student who participated in the service and in the documentation of the services provided. I have verified the history and personally performed the physical exam and medical decision making, as documented by the student and edited by me. Vilma Roe, JASON IVETT

## 2021-12-04 LAB — TSH SERPL DL<=0.005 MIU/L-ACNC: 0.81 MU/L (ref 0.4–4)

## 2022-01-07 ENCOUNTER — IMMUNIZATION (OUTPATIENT)
Dept: NURSING | Facility: CLINIC | Age: 37
End: 2022-01-07
Payer: COMMERCIAL

## 2022-01-07 DIAGNOSIS — Z23 HIGH PRIORITY FOR 2019-NCOV VACCINE: Primary | ICD-10-CM

## 2022-01-07 PROCEDURE — 0004A COVID-19,PF,PFIZER (12+ YRS): CPT

## 2022-01-07 PROCEDURE — 90471 IMMUNIZATION ADMIN: CPT

## 2022-01-07 PROCEDURE — 91300 COVID-19,PF,PFIZER (12+ YRS): CPT

## 2022-01-07 PROCEDURE — 90715 TDAP VACCINE 7 YRS/> IM: CPT

## 2022-09-03 ENCOUNTER — HEALTH MAINTENANCE LETTER (OUTPATIENT)
Age: 37
End: 2022-09-03

## 2022-11-07 DIAGNOSIS — Z30.40 ENCOUNTER FOR REFILL OF PRESCRIPTION FOR CONTRACEPTION: ICD-10-CM

## 2022-11-11 RX ORDER — ACETAMINOPHEN AND CODEINE PHOSPHATE 120; 12 MG/5ML; MG/5ML
SOLUTION ORAL
Qty: 84 TABLET | Refills: 0 | Status: SHIPPED | OUTPATIENT
Start: 2022-11-11 | End: 2023-02-08

## 2022-11-11 NOTE — TELEPHONE ENCOUNTER
Last office visit 12/2021  Refill extension provided and message sent to patient for scheduling annual

## 2023-01-15 ENCOUNTER — HEALTH MAINTENANCE LETTER (OUTPATIENT)
Age: 38
End: 2023-01-15

## 2023-02-09 ENCOUNTER — OFFICE VISIT (OUTPATIENT)
Dept: FAMILY MEDICINE | Facility: CLINIC | Age: 38
End: 2023-02-09
Payer: COMMERCIAL

## 2023-02-09 ENCOUNTER — HOSPITAL ENCOUNTER (OUTPATIENT)
Dept: MAMMOGRAPHY | Facility: CLINIC | Age: 38
Discharge: HOME | End: 2023-02-09
Attending: PHYSICIAN ASSISTANT
Payer: COMMERCIAL

## 2023-02-09 ENCOUNTER — HOSPITAL ENCOUNTER (OUTPATIENT)
Dept: ULTRASOUND IMAGING | Facility: CLINIC | Age: 38
Discharge: HOME | End: 2023-02-09
Attending: PHYSICIAN ASSISTANT
Payer: COMMERCIAL

## 2023-02-09 VITALS
WEIGHT: 283.2 LBS | RESPIRATION RATE: 18 BRPM | HEART RATE: 89 BPM | TEMPERATURE: 98.5 F | HEIGHT: 68 IN | DIASTOLIC BLOOD PRESSURE: 96 MMHG | SYSTOLIC BLOOD PRESSURE: 126 MMHG | OXYGEN SATURATION: 100 % | BODY MASS INDEX: 42.92 KG/M2

## 2023-02-09 DIAGNOSIS — Z11.59 NEED FOR HEPATITIS C SCREENING TEST: ICD-10-CM

## 2023-02-09 DIAGNOSIS — Z12.4 CERVICAL CANCER SCREENING: ICD-10-CM

## 2023-02-09 DIAGNOSIS — Z00.00 ROUTINE GENERAL MEDICAL EXAMINATION AT A HEALTH CARE FACILITY: Primary | ICD-10-CM

## 2023-02-09 DIAGNOSIS — N63.21 MASS OF UPPER OUTER QUADRANT OF LEFT BREAST: ICD-10-CM

## 2023-02-09 DIAGNOSIS — Z13.1 SCREENING FOR DIABETES MELLITUS: ICD-10-CM

## 2023-02-09 DIAGNOSIS — E66.01 MORBID OBESITY (H): ICD-10-CM

## 2023-02-09 DIAGNOSIS — Z13.220 SCREENING, LIPID: ICD-10-CM

## 2023-02-09 DIAGNOSIS — E04.9 THYROID GOITER: ICD-10-CM

## 2023-02-09 DIAGNOSIS — Z11.4 SCREENING FOR HIV (HUMAN IMMUNODEFICIENCY VIRUS): ICD-10-CM

## 2023-02-09 DIAGNOSIS — Z30.40 ENCOUNTER FOR REFILL OF PRESCRIPTION FOR CONTRACEPTION: ICD-10-CM

## 2023-02-09 DIAGNOSIS — Z80.3 FAMILY HISTORY OF MALIGNANT NEOPLASM OF BREAST: ICD-10-CM

## 2023-02-09 PROCEDURE — G0145 SCR C/V CYTO,THINLAYER,RESCR: HCPCS | Performed by: PHYSICIAN ASSISTANT

## 2023-02-09 PROCEDURE — 99395 PREV VISIT EST AGE 18-39: CPT | Performed by: PHYSICIAN ASSISTANT

## 2023-02-09 PROCEDURE — 87624 HPV HI-RISK TYP POOLED RSLT: CPT | Performed by: PHYSICIAN ASSISTANT

## 2023-02-09 PROCEDURE — 76642 ULTRASOUND BREAST LIMITED: CPT | Mod: LT

## 2023-02-09 PROCEDURE — 77062 BREAST TOMOSYNTHESIS BI: CPT

## 2023-02-09 RX ORDER — ACETAMINOPHEN AND CODEINE PHOSPHATE 120; 12 MG/5ML; MG/5ML
SOLUTION ORAL
Qty: 84 TABLET | Refills: 3 | Status: SHIPPED | OUTPATIENT
Start: 2023-02-09 | End: 2024-06-21

## 2023-02-09 SDOH — ECONOMIC STABILITY: FOOD INSECURITY: WITHIN THE PAST 12 MONTHS, THE FOOD YOU BOUGHT JUST DIDN'T LAST AND YOU DIDN'T HAVE MONEY TO GET MORE.: NEVER TRUE

## 2023-02-09 SDOH — ECONOMIC STABILITY: INCOME INSECURITY: HOW HARD IS IT FOR YOU TO PAY FOR THE VERY BASICS LIKE FOOD, HOUSING, MEDICAL CARE, AND HEATING?: NOT VERY HARD

## 2023-02-09 SDOH — ECONOMIC STABILITY: TRANSPORTATION INSECURITY
IN THE PAST 12 MONTHS, HAS THE LACK OF TRANSPORTATION KEPT YOU FROM MEDICAL APPOINTMENTS OR FROM GETTING MEDICATIONS?: NO

## 2023-02-09 SDOH — ECONOMIC STABILITY: TRANSPORTATION INSECURITY
IN THE PAST 12 MONTHS, HAS LACK OF TRANSPORTATION KEPT YOU FROM MEETINGS, WORK, OR FROM GETTING THINGS NEEDED FOR DAILY LIVING?: NO

## 2023-02-09 SDOH — ECONOMIC STABILITY: FOOD INSECURITY: WITHIN THE PAST 12 MONTHS, YOU WORRIED THAT YOUR FOOD WOULD RUN OUT BEFORE YOU GOT MONEY TO BUY MORE.: NEVER TRUE

## 2023-02-09 SDOH — HEALTH STABILITY: PHYSICAL HEALTH: ON AVERAGE, HOW MANY DAYS PER WEEK DO YOU ENGAGE IN MODERATE TO STRENUOUS EXERCISE (LIKE A BRISK WALK)?: 5 DAYS

## 2023-02-09 SDOH — HEALTH STABILITY: PHYSICAL HEALTH: ON AVERAGE, HOW MANY MINUTES DO YOU ENGAGE IN EXERCISE AT THIS LEVEL?: 30 MIN

## 2023-02-09 SDOH — ECONOMIC STABILITY: INCOME INSECURITY: IN THE LAST 12 MONTHS, WAS THERE A TIME WHEN YOU WERE NOT ABLE TO PAY THE MORTGAGE OR RENT ON TIME?: NO

## 2023-02-09 ASSESSMENT — ENCOUNTER SYMPTOMS
PALPITATIONS: 0
MYALGIAS: 0
SHORTNESS OF BREATH: 0
HEARTBURN: 0
FREQUENCY: 0
HEMATOCHEZIA: 0
NAUSEA: 0
JOINT SWELLING: 0
EYE PAIN: 0
ABDOMINAL PAIN: 0
HEADACHES: 0
ARTHRALGIAS: 0
CHILLS: 0
DIARRHEA: 0
WEAKNESS: 0
NERVOUS/ANXIOUS: 0
FEVER: 0
CONSTIPATION: 0
PARESTHESIAS: 0
DYSURIA: 0
SORE THROAT: 0
HEMATURIA: 0
COUGH: 0
DIZZINESS: 0
BREAST MASS: 0

## 2023-02-09 ASSESSMENT — SOCIAL DETERMINANTS OF HEALTH (SDOH)
HOW OFTEN DO YOU ATTEND CHURCH OR RELIGIOUS SERVICES?: NEVER
IN A TYPICAL WEEK, HOW MANY TIMES DO YOU TALK ON THE PHONE WITH FAMILY, FRIENDS, OR NEIGHBORS?: MORE THAN THREE TIMES A WEEK
DO YOU BELONG TO ANY CLUBS OR ORGANIZATIONS SUCH AS CHURCH GROUPS UNIONS, FRATERNAL OR ATHLETIC GROUPS, OR SCHOOL GROUPS?: YES
ARE YOU MARRIED, WIDOWED, DIVORCED, SEPARATED, NEVER MARRIED, OR LIVING WITH A PARTNER?: LIVING WITH PARTNER
HOW OFTEN DO YOU GET TOGETHER WITH FRIENDS OR RELATIVES?: ONCE A WEEK

## 2023-02-09 ASSESSMENT — LIFESTYLE VARIABLES
AUDIT-C TOTAL SCORE: 2
SKIP TO QUESTIONS 9-10: 1
HOW OFTEN DO YOU HAVE A DRINK CONTAINING ALCOHOL: 2-4 TIMES A MONTH
HOW OFTEN DO YOU HAVE SIX OR MORE DRINKS ON ONE OCCASION: NEVER
HOW MANY STANDARD DRINKS CONTAINING ALCOHOL DO YOU HAVE ON A TYPICAL DAY: 1 OR 2

## 2023-02-09 NOTE — PROGRESS NOTES
SUBJECTIVE:   CC: Trisha is an 37 year old who presents for preventive health visit.   Patient has been advised of split billing requirements and indicates understanding: Yes  Healthy Habits:     Getting at least 3 servings of Calcium per day:  Yes    Bi-annual eye exam:  NO    Dental care twice a year:  NO    Sleep apnea or symptoms of sleep apnea:  None    Diet:  Vegetarian/vegan    Frequency of exercise:  2-3 days/week    Duration of exercise:  15-30 minutes    Taking medications regularly:  Yes    Medication side effects:  None    PHQ-2 Total Score: 0    Additional concerns today:  No               Today's PHQ-2 Score:   PHQ-2 (  [employer]) 2023   Q1: Little interest or pleasure in doing things 0   Q2: Feeling down, depressed or hopeless 0   PHQ-2 Score 0   PHQ-2 Total Score (12-17 Years)- Positive if 3 or more points; Administer PHQ-A if positive -   Q1: Little interest or pleasure in doing things Not at all   Q2: Feeling down, depressed or hopeless Not at all   PHQ-2 Score 0       Have you ever done Advance Care Planning? (For example, a Health Directive, POLST, or a discussion with a medical provider or your loved ones about your wishes): No, advance care planning information given to patient to review.  Patient plans to discuss their wishes with loved ones or provider.      Social History     Tobacco Use     Smoking status: Former     Years: 10.00     Types: Cigarettes     Quit date: 2015     Years since quittin.6     Smokeless tobacco: Never   Substance Use Topics     Alcohol use: Yes     Comment: 1 time per week, 2 per time       Alcohol Use 2023   Prescreen: >3 drinks/day or >7 drinks/week? No   Prescreen: >3 drinks/day or >7 drinks/week? -       Reviewed orders with patient.  Reviewed health maintenance and updated orders accordingly - Yes  BP Readings from Last 3 Encounters:   23 (!) 126/96   21 130/80   20 132/88    Wt Readings from Last 3 Encounters:   23  128.5 kg (283 lb 3.2 oz)   12/03/21 117.7 kg (259 lb 6.4 oz)   01/28/20 122.9 kg (271 lb)                  Recent Labs   Lab Test 12/03/21  1042 12/13/18  1036 10/06/17  1612   A1C  --  5.1  --    LDL  --  82  --    HDL  --  57  --    TRIG  --  122  --    TSH 0.81  --  0.96        Breast Cancer Screening:  Any new diagnosis of family breast, ovarian, or bowel cancer? No    FHS-7:   Breast CA Risk Assessment (FHS-7) 2/9/2023 2/9/2023   Did any of your first-degree relatives have breast or ovarian cancer? Yes No   Did any of your relatives have bilateral breast cancer? Unknown No   Did any man in your family have breast cancer? No -   Did any woman in your family have breast and ovarian cancer? Yes No   Did any woman in your family have breast cancer before age 50 y? Yes Yes   Do you have 2 or more relatives with breast and/or ovarian cancer? No No   Do you have 2 or more relatives with breast and/or bowel cancer? No No     click delete button to remove this line now  Patient under 40 years of age: Routine Mammogram Screening not recommended.   Pertinent mammograms are reviewed under the imaging tab.    History of abnormal Pap smear: NO - age 30-65 PAP every 5 years with negative HPV co-testing recommended  PAP / HPV Latest Ref Rng & Units 10/6/2017 12/24/2014 5/2/2012   PAP (Historical) - NIL NIL NIL   HPV16 NEG:Negative Negative - -   HPV18 NEG:Negative Negative - -   HRHPV NEG:Negative Negative - -     Reviewed and updated as needed this visit by clinical staff   Tobacco  Allergies  Meds  Problems             Reviewed and updated as needed this visit by Provider      Problems                Review of Systems   Constitutional: Negative for chills and fever.   HENT: Negative for congestion, ear pain, hearing loss and sore throat.    Eyes: Negative for pain and visual disturbance.   Respiratory: Negative for cough and shortness of breath.    Cardiovascular: Negative for chest pain, palpitations and peripheral  "edema.   Gastrointestinal: Negative for abdominal pain, constipation, diarrhea, heartburn, hematochezia and nausea.   Breasts:  Negative for tenderness, breast mass and discharge.   Genitourinary: Negative for dysuria, frequency, genital sores, hematuria, pelvic pain, urgency, vaginal bleeding and vaginal discharge.   Musculoskeletal: Negative for arthralgias, joint swelling and myalgias.   Skin: Negative for rash.   Neurological: Negative for dizziness, weakness, headaches and paresthesias.   Psychiatric/Behavioral: Negative for mood changes. The patient is not nervous/anxious.        OBJECTIVE:   BP (!) 126/96 (BP Location: Right arm, Patient Position: Sitting, Cuff Size: Adult Large)   Pulse 89   Temp 98.5  F (36.9  C) (Oral)   Resp 18   Ht 1.721 m (5' 7.75\")   Wt 128.5 kg (283 lb 3.2 oz)   SpO2 100%   BMI 43.38 kg/m    Physical Exam  GENERAL: healthy, alert and no distress  EYES: Eyes grossly normal to inspection, PERRL and conjunctivae and sclerae normal  HENT: ear canals and TM's normal, nose and mouth without ulcers or lesions  NECK: no adenopathy, no asymmetry, masses, or scars and thyroid normal to palpation  RESP: lungs clear to auscultation - no rales, rhonchi or wheezes  BREAST: no palpable axillary masses or adenopathy and mass left breast   CV: regular rate and rhythm, normal S1 S2, no S3 or S4, no murmur, click or rub, no peripheral edema and peripheral pulses strong  ABDOMEN: soft, nontender, no hepatosplenomegaly, no masses and bowel sounds normal  MS: no gross musculoskeletal defects noted, no edema  SKIN: no suspicious lesions or rashes  NEURO: Normal strength and tone, mentation intact and speech normal  PSYCH: mentation appears normal, affect normal/bright        ASSESSMENT/PLAN:       (Z11.4) Screening for HIV (human immunodeficiency virus)  Comment:   Plan: HIV Antigen Antibody Combo            (Z11.59) Need for hepatitis C screening test  Comment:   Plan: Hepatitis C Screen Reflex to " HCV RNA Quant and         Genotype            (Z12.4) Cervical cancer screening  Comment:   Plan: Pap Screen with HPV - recommended age 30 - 65         years, HPV Hold (Lab Only)            (Z00.00) Routine general medical examination at a health care facility  Comment:   Plan:     (Z30.40) Encounter for refill of prescription for contraception  Comment:   Plan: norethindrone (NORLYDA) 0.35 MG tablet            (E04.9) Thyroid goiter  Comment:   Plan: TSH with free T4 reflex            (Z13.1) Screening for diabetes mellitus  Comment:   Plan: Comprehensive metabolic panel            (Z13.220) Screening, lipid  Comment:   Plan: Lipid Profile            (N63.21) Mass of upper outer quadrant of left breast  Comment:   Plan: CANCELED: MA Diagnostic Digital Bilateral,         CANCELED: US Breast Left Complete 4 Quadrants            (E66.01) Morbid obesity (H)  Comment:   Plan. Recommend eliminating gluten, diary, hydrogenated fats and sugar.  A          COUNSELING:  Reviewed preventive health counseling, as reflected in patient instructions       Regular exercise       Healthy diet/nutrition       Vision screening       Hearing screening        She reports that she quit smoking about 7 years ago. Her smoking use included cigarettes. She has never used smokeless tobacco.      Ramona Ann Aaseby-Aguilera, PA-C  [facility]

## 2023-02-11 ENCOUNTER — TELEPHONE (OUTPATIENT)
Dept: NURSING | Facility: CLINIC | Age: 38
End: 2023-02-11
Payer: COMMERCIAL

## 2023-02-11 NOTE — TELEPHONE ENCOUNTER
Patient needs her birth control at a different pharmacy. Patient advised of the information below:      Transferring a prescription from one pharmacy to another is easy. In fact, pharmacies do most of the work.         Here's how it works:   1. Simply call, stop by your local pharmacy of your of choice.  2. Provide the name of the medication along with the name and phone number of your old pharmacy.   3. Your NEW pharmacy will contact the pharmacy where your prescription is currently on file to get the information needed to fill your prescription.       Advised if there is a problem to call back.    Chelo Hope RN  [place] Nurse Advisor  11:29 AM  2/11/2023

## 2023-02-13 LAB
BKR LAB AP GYN ADEQUACY: NORMAL
BKR LAB AP GYN INTERPRETATION: NORMAL
BKR LAB AP HPV REFLEX: NORMAL
BKR LAB AP PREVIOUS ABNORMAL: NORMAL
PATH REPORT.COMMENTS IMP SPEC: NORMAL
PATH REPORT.COMMENTS IMP SPEC: NORMAL
PATH REPORT.RELEVANT HX SPEC: NORMAL

## 2023-02-15 LAB
HUMAN PAPILLOMA VIRUS 16 DNA: NEGATIVE
HUMAN PAPILLOMA VIRUS 18 DNA: NEGATIVE
HUMAN PAPILLOMA VIRUS FINAL DIAGNOSIS: NORMAL
HUMAN PAPILLOMA VIRUS OTHER HR: NEGATIVE

## 2023-03-05 ENCOUNTER — TELEPHONE (OUTPATIENT)
Dept: NURSING | Facility: CLINIC | Age: 38
End: 2023-03-05

## 2023-03-05 ENCOUNTER — OFFICE VISIT (OUTPATIENT)
Dept: FAMILY MEDICINE | Facility: CLINIC | Age: 38
End: 2023-03-05
Payer: COMMERCIAL

## 2023-03-05 VITALS
HEART RATE: 99 BPM | BODY MASS INDEX: 42.89 KG/M2 | TEMPERATURE: 98.1 F | SYSTOLIC BLOOD PRESSURE: 143 MMHG | DIASTOLIC BLOOD PRESSURE: 90 MMHG | OXYGEN SATURATION: 100 % | WEIGHT: 280 LBS

## 2023-03-05 DIAGNOSIS — J02.0 STREP PHARYNGITIS: Primary | ICD-10-CM

## 2023-03-05 LAB — DEPRECATED S PYO AG THROAT QL EIA: POSITIVE

## 2023-03-05 PROCEDURE — 99213 OFFICE O/P EST LOW 20 MIN: CPT | Performed by: STUDENT IN AN ORGANIZED HEALTH CARE EDUCATION/TRAINING PROGRAM

## 2023-03-05 PROCEDURE — 87880 STREP A ASSAY W/OPTIC: CPT | Performed by: STUDENT IN AN ORGANIZED HEALTH CARE EDUCATION/TRAINING PROGRAM

## 2023-03-05 RX ORDER — PENICILLIN V POTASSIUM 250 MG/5ML
500 SOLUTION, RECONSTITUTED, ORAL ORAL 2 TIMES DAILY
Qty: 200 ML | Refills: 0 | Status: SHIPPED | OUTPATIENT
Start: 2023-03-05 | End: 2023-03-05

## 2023-03-05 RX ORDER — AMOXICILLIN 400 MG/5ML
1000 POWDER, FOR SUSPENSION ORAL DAILY
Qty: 125 ML | Refills: 0 | Status: SHIPPED | OUTPATIENT
Start: 2023-03-05 | End: 2023-03-15

## 2023-03-05 NOTE — TELEPHONE ENCOUNTER
Pharmacy calling to inform that penicillin V is not available. Recommends amoxicillin or cefdinir. Advised Rodrigo that Rx can be sent within 30 minutes.    [facility] and will send new Rx.    [facility] phoned [facility] pharmacist and confirmed receipt of Rx      Carolynn Hart RN/[place] Nurse Advisor

## 2023-03-05 NOTE — PROGRESS NOTES
ASSESSMENT/PLAN:  (J02.0) Strep pharyngitis  (primary encounter diagnosis)  Comment: 1 day of sore throat with enlarged and tender tonsils, rapid strep test positive.  Will treat with 10-day course of amoxicillin, patient requesting liquid form due to painful swallowing.  Plan: Streptococcus A Rapid Screen w/Reflex to PCR -         Clinic Collect, amoxicillin 1 g daily 10 days      Appropriate PPE worn.    Options for treatment and follow-up care were reviewed with the patient and/or guardian. Elodia Huber and/or guardian engaged in the decision making process and verbalized understanding of the options discussed and agreed with the final plan.    See [facility] for orders, medications, letters, patient instructions  This note was dictated with [email or web address].    Christine Hernandez MD    SUBJECTIVE:  Elodia Huber is an 37 year old female who presents for strep concern.  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  Patient Active Problem List   Diagnosis     CARDIOVASCULAR SCREENING; LDL GOAL LESS THAN 160     Eczema     Obesity (BMI 30-39.9)     Contraceptive management     Cystic acne     Milk intolerance     Thyroid goiter     Vitamin D deficiency     Morbid obesity (H)     Obesity, Class III, BMI 40-49.9 (morbid obesity) (H)     Social History     Socioeconomic History     Marital status: Single   Tobacco Use     Smoking status: Former     Years: 10.00     Types: Cigarettes     Quit date: 2015     Years since quittin.7     Smokeless tobacco: Never   Substance and Sexual Activity     Alcohol use: Yes     Comment: 1 time per week, 2 per time      Drug use: No     Sexual activity: Yes     Partners: Male     Birth control/protection: Pill   Other Topics Concern     Parent/sibling w/ CABG, MI or angioplasty before 65F 55M? No     Social Determinants of Health     Financial Resource Strain: Low Risk      Difficulty of Paying Living Expenses: Not very hard   Food Insecurity: No Food Insecurity     Worried About Running Out of Food in the Last Year: Never true     Ran Out of Food in the Last Year: Never true   Transportation Needs: No Transportation Needs     Lack of Transportation (Medical): No     Lack of Transportation (Non-Medical): No   Physical Activity: Sufficiently Active     Days of Exercise per Week: 5 days     Minutes of Exercise per Session: 30 min   Stress: No Stress Concern Present     Feeling of Stress : Only a little   Social Connections: Moderately Integrated     Frequency of Communication with Friends and Family: More than three times a week     Frequency of Social Gatherings with Friends and Family: Once a week     Attends Synagogue Services: Never     Active Member of Clubs or Organizations: Yes     Marital Status: Living with partner   Housing Stability: Low Risk      Unable to Pay for Housing in the Last Year: No     Number of Places Lived in the Last Year: 1     Unstable Housing in the Last Year: No     Family History   Problem Relation Age of Onset     No Known Problems Mother      Cerebrovascular Disease Father      Hypertension Father      Cancer Maternal Grandfather         unknown     Cancer Paternal Grandmother         breast     Breast Cancer Paternal Grandmother        ALLERGIES:  No known drug allergies    Current Outpatient Medications   Medication     loratadine (CLARITIN) 10 MG tablet     norethindrone (NORLYDA) 0.35 MG tablet     No current facility-administered medications for this visit.         ROS:  ROS is done and is negative for general/constitutional, eye, ENT, Respiratory, cardiovascular, GI, , Skin,  musculoskeletal except as noted elsewhere.  All other review of systems negative except as noted elsewhere.    OBJECTIVE:  BP (!) 143/90   Pulse 99   Temp 98.1  F (36.7  C) (Oral)   Wt 127 kg (280 lb)   SpO2 100%   BMI 42.89 kg/m    General: Sitting comfortably. No acute distress.   HEENT: Conjunctivae are clear without discharge or erythema.  Posterior pharynx with swollen tonsils with exudate  Neck: No masses.  Enlarged tonsils, tender to palpation  Respiratory: No respiratory distress. Lung sounds are clear without rales, ronchi, or wheezes.   Cardiac: Warm and well perfused. Brisk cap refill.  Abdominal: Abdomen is soft and non-tender.  Extremities: Upper and lower extremities grossly normal.  Skin: Skin warm without rashes.  Neurological: Motor function is grossly normal.   Psychiatric: Good insight and judgement.      RESULTS  No results found for any visits on 03/05/23.  No results found for this or any previous visit (from the past 48 hour(s)).

## 2023-05-05 DIAGNOSIS — Z30.40 ENCOUNTER FOR REFILL OF PRESCRIPTION FOR CONTRACEPTION: ICD-10-CM

## 2023-05-05 RX ORDER — ACETAMINOPHEN AND CODEINE PHOSPHATE 120; 12 MG/5ML; MG/5ML
SOLUTION ORAL
Qty: 84 TABLET | Refills: 3 | Status: CANCELLED | OUTPATIENT
Start: 2023-05-05

## 2023-11-08 ENCOUNTER — OFFICE VISIT (OUTPATIENT)
Dept: FAMILY MEDICINE | Facility: CLINIC | Age: 38
End: 2023-11-08
Payer: COMMERCIAL

## 2023-11-08 ENCOUNTER — HOSPITAL ENCOUNTER (OUTPATIENT)
Dept: GENERAL RADIOLOGY | Facility: HOSPITAL | Age: 38
Discharge: HOME | End: 2023-11-08
Attending: PHYSICIAN ASSISTANT | Admitting: PHYSICIAN ASSISTANT
Payer: COMMERCIAL

## 2023-11-08 VITALS
TEMPERATURE: 98.2 F | RESPIRATION RATE: 18 BRPM | DIASTOLIC BLOOD PRESSURE: 110 MMHG | SYSTOLIC BLOOD PRESSURE: 161 MMHG | HEART RATE: 120 BPM | OXYGEN SATURATION: 100 %

## 2023-11-08 DIAGNOSIS — B34.9 VIRAL ILLNESS: ICD-10-CM

## 2023-11-08 DIAGNOSIS — R05.1 ACUTE COUGH: Primary | ICD-10-CM

## 2023-11-08 PROCEDURE — 99213 OFFICE O/P EST LOW 20 MIN: CPT | Performed by: PHYSICIAN ASSISTANT

## 2023-11-08 PROCEDURE — 71046 X-RAY EXAM CHEST 2 VIEWS: CPT

## 2023-11-08 RX ORDER — ALBUTEROL SULFATE 90 UG/1
2 AEROSOL, METERED RESPIRATORY (INHALATION) EVERY 6 HOURS
Qty: 18 G | Refills: 0 | Status: SHIPPED | OUTPATIENT
Start: 2023-11-08 | End: 2023-12-08

## 2023-11-08 RX ORDER — PREDNISONE 20 MG/1
40 TABLET ORAL DAILY
Qty: 10 TABLET | Refills: 0 | Status: SHIPPED | OUTPATIENT
Start: 2023-11-08 | End: 2023-11-13

## 2023-11-08 RX ORDER — AZITHROMYCIN 500 MG/1
500 TABLET, FILM COATED ORAL DAILY
Qty: 5 TABLET | Refills: 0 | Status: SHIPPED | OUTPATIENT
Start: 2023-11-08 | End: 2023-11-13

## 2023-11-08 RX ORDER — BENZONATATE 100 MG/1
100 CAPSULE ORAL 3 TIMES DAILY PRN
Qty: 30 CAPSULE | Refills: 0 | Status: SHIPPED | OUTPATIENT
Start: 2023-11-08 | End: 2023-11-18

## 2023-11-08 NOTE — PROGRESS NOTES
Patient presents with:  Cough: X 11 days, cough getting worse. Heavy chest cough. Post nasal drip, pain in neck, head and shoulders. Negative covid test two nights ago.       Clinical Decision Making:  Patient is treated with respiratory antibiotic albuterol inhaler and prednisone for anti-inflammatory effect.  Patient may use benzonatate at nighttime for cough. Expected course of resolution and indication for return was gone over and questions were answered to patient/parent's satisfaction before discharge.        ICD-10-CM    1. Acute cough  R05.1 XR Chest 2 Views     albuterol (PROAIR HFA/PROVENTIL HFA/VENTOLIN HFA) 108 (90 Base) MCG/ACT inhaler     azithromycin (ZITHROMAX) 500 MG tablet     benzonatate (TESSALON) 100 MG capsule     predniSONE (DELTASONE) 20 MG tablet      2. Viral illness  B34.9 XR Chest 2 Views          Patient Instructions   You were seen today for acute bronchitis. This is likely due to a viral illness.    Symptom management:  - Get plenty of rest  - Avoid smoking and second hand smoke  - May take tylenol or ibuprofen for fever/discomfort  - Drink plenty of non-caffeinated fluids  - Use nasal steroid spray for sinus congestion  - Albuterol inhaler may be used every 6 hours as needed for chest tightness      Reasons to be seen in the emergency room:  - Develop a fever of 100.4 or higher  - Cough changes, coughing up blood, or become short of breath  - Neck stiffness  - Chest pain  - Severe headache  - Unable to tolerate eating or drinking fluids    Otherwise, if no symptom improvement after 5 days, follow-up with your primary care provider.        HPI:  Elodia Huber is a 38 year old female who presents today for a 11-day history of cough that is productive of scant phlegm.  Patient has had difficulty with laying flat and sleeping at night because of the cough.  She has had poor sleep which has precipitated her ocular migraine.  She has had myalgias but no arthralgias sore throat chills  night sweats vomiting or diarrhea.  Continues to eat and drink normally.  Patient had a COVID test at home 2 days ago which was returned as negative.  Patient has had a flu shot this season.  Use of NyQuil, Advil, aspirin, salt water gargles, increased fluids and rest and Delsym cough preparation over-the-counter for treatment at home    History obtained from chart review and the patient.    Problem List:  2017-10: Morbid obesity (H)  2013: Vitamin D deficiency  2013: Obesity (BMI 30-39.9)  2013: Contraceptive management  2013: Cystic acne  2013: Milk intolerance  2013: Thyroid goiter  2012: CARDIOVASCULAR SCREENING; LDL GOAL LESS THAN 160  2012: Obese  2012: Eczema  Obesity, Class III, BMI 40-49.9 (morbid obesity) (H)      Past Medical History:   Diagnosis Date    Obesity, Class III, BMI 40-49.9 (morbid obesity) (H)     Thyroid goiter     Uses birth control        Social History     Tobacco Use    Smoking status: Former     Years: 10     Types: Cigarettes     Quit date: 2015     Years since quittin.4    Smokeless tobacco: Never   Substance Use Topics    Alcohol use: Yes     Comment: 1 time per week, 2 per time       Review of Systems  As above in HPI otherwise negative.    Vitals:    23 1558   BP: (!) 161/110   BP Location: Right arm   Patient Position: Sitting   Cuff Size: Adult Large   Pulse: 120   Resp: 18   Temp: 98.2  F (36.8  C)   TempSrc: Oral   SpO2: 100%       General: Patient is resting comfortably no acute distress is afebrile  HEENT: Head is normocephalic atraumatic   eyes are PERRL EOMI sclera anicteric   TMs are clear bilaterally  Throat is without pharyngeal wall erythema and no exudate  No cervical lymphadenopathy present  LUNGS: Prolonged expiratory wheezes in the mid to lower lung fields.  Normal respiratory effort and excursion.  Patient is coughing in the office encounter.  HEART: Regular rate and rhythm  Skin: Without rash non-diaphoretic    Physical  Exam      Labs:  Results for orders placed or performed in visit on 11/08/23   XR Chest 2 Views     Status: None    Narrative    EXAM: XR CHEST 2 VIEWS  LOCATION: [facility]  DATE: 11/8/2023    INDICATION: coug x 10 days  COMPARISON: None.      Impression    IMPRESSION: Negative chest.       Radiology:  I have personally ordered and preliminarily reviewed the following xray, I have noted no infiltrate or consolidation on x-ray    At the end of the encounter, I discussed results, diagnosis, medications. Discussed red flags for immediate return to clinic/ER, as well as indications for follow up if no improvement. Patient understood and agreed to plan. Patient was stable for discharge.

## 2023-11-08 NOTE — LETTER
November 8, 2023      Elodia Huber  [facility] [place] [address]        To Whom It May Concern:    Elodia Huber was seen in our clinic. She may return to work without restrictions 11/10/23.      Sincerely,        Senthil Brownlee PA-C

## 2024-01-10 ENCOUNTER — PATIENT OUTREACH (OUTPATIENT)
Dept: CARE COORDINATION | Facility: CLINIC | Age: 39
End: 2024-01-10
Payer: COMMERCIAL

## 2024-01-24 ENCOUNTER — PATIENT OUTREACH (OUTPATIENT)
Dept: CARE COORDINATION | Facility: CLINIC | Age: 39
End: 2024-01-24
Payer: COMMERCIAL

## 2024-04-05 ENCOUNTER — TELEPHONE (OUTPATIENT)
Dept: OBGYN | Facility: CLINIC | Age: 39
End: 2024-04-05
Payer: COMMERCIAL

## 2024-04-05 DIAGNOSIS — Z30.9 CONTRACEPTIVE MANAGEMENT: Primary | ICD-10-CM

## 2024-04-05 RX ORDER — ACETAMINOPHEN AND CODEINE PHOSPHATE 120; 12 MG/5ML; MG/5ML
0.35 SOLUTION ORAL DAILY
Qty: 84 TABLET | Refills: 0 | Status: SHIPPED | OUTPATIENT
Start: 2024-04-05

## 2024-04-05 NOTE — TELEPHONE ENCOUNTER
norethindrone (NORLYDA) 0.35 MG tablet       Last Written Prescription Date:  2/9/23  Last Fill Quantity: 84,   # refills: 3  Last Office Visit: 2/9/23  Future Office visit:   6/26/24    Prescription approved per [facility] Refill Protocol.     ROX Crenshaw

## 2024-04-05 NOTE — TELEPHONE ENCOUNTER
[facility] Call Center    Phone Message    May a detailed message be left on voicemail: yes     Reason for Call: Other: Patient is coming in for a annual physical in June but is wondering if her birth control can be filled until then. She stated she usually has it filled by whoever does her annual physical. Please call her back with any questions. Thank you.      norethindrone (NORLYDA) 0.35 MG tablet     [id number] - [place], [place] - [address] AT [facility]     Action Taken: Other: obgyn    Travel Screening: Not Applicable

## 2024-04-27 ENCOUNTER — HEALTH MAINTENANCE LETTER (OUTPATIENT)
Age: 39
End: 2024-04-27

## 2024-05-19 ENCOUNTER — OFFICE VISIT (OUTPATIENT)
Dept: FAMILY MEDICINE | Facility: CLINIC | Age: 39
End: 2024-05-19
Payer: COMMERCIAL

## 2024-05-19 VITALS
BODY MASS INDEX: 42.44 KG/M2 | WEIGHT: 280 LBS | SYSTOLIC BLOOD PRESSURE: 130 MMHG | RESPIRATION RATE: 15 BRPM | OXYGEN SATURATION: 98 % | DIASTOLIC BLOOD PRESSURE: 84 MMHG | HEART RATE: 99 BPM | TEMPERATURE: 99.4 F | HEIGHT: 68 IN

## 2024-05-19 DIAGNOSIS — R07.0 THROAT PAIN: ICD-10-CM

## 2024-05-19 DIAGNOSIS — J02.0 STREPTOCOCCAL PHARYNGITIS: Primary | ICD-10-CM

## 2024-05-19 DIAGNOSIS — H66.003 NON-RECURRENT ACUTE SUPPURATIVE OTITIS MEDIA OF BOTH EARS WITHOUT SPONTANEOUS RUPTURE OF TYMPANIC MEMBRANES: ICD-10-CM

## 2024-05-19 LAB — DEPRECATED S PYO AG THROAT QL EIA: POSITIVE

## 2024-05-19 PROCEDURE — 99213 OFFICE O/P EST LOW 20 MIN: CPT | Performed by: NURSE PRACTITIONER

## 2024-05-19 PROCEDURE — 87880 STREP A ASSAY W/OPTIC: CPT | Performed by: NURSE PRACTITIONER

## 2024-05-19 RX ORDER — AMOXICILLIN 500 MG/1
1000 CAPSULE ORAL 2 TIMES DAILY
Qty: 40 CAPSULE | Refills: 0 | Status: SHIPPED | OUTPATIENT
Start: 2024-05-19 | End: 2024-05-29

## 2024-05-19 NOTE — PROGRESS NOTES
Patient presents with:  Pharyngitis      Clinical Decision Making: Focused exam positive for ill-appearing adult, with significant erythemic pharynx, mild tonsillar hypertrophy, airway patent.  Adenopathy present.  Significant nasal congestion present.  Lung sounds clear.  Patient with low-grade temp of 99.4F, otherwise vitally stable.  Rapid strep positive.    Clinical presentation and medical decision making consistent with strep pharyngitis in the setting of bilateral otitis media.  Will treat with a 10-day course of antibiotics.  Encouraged full completion.  Discussed transmission of strep pharyngitis, avoid sharing food/drinks, letter for work provided.  Encouraged increased rest and hydration.  Discussed symptomatic cares with OTC acetaminophen/ibuprofen.    Reviewed red flag symptoms and when to return for reevaluation, education provided.      ICD-10-CM    1. Streptococcal pharyngitis  J02.0 amoxicillin (AMOXIL) 500 MG capsule      2. Throat pain  R07.0 Streptococcus A Rapid Screen w/Reflex to PCR - Clinic Collect      3. Non-recurrent acute suppurative otitis media of both ears without spontaneous rupture of tympanic membranes  H66.003 amoxicillin (AMOXIL) 500 MG capsule          There are no Patient Instructions on file for this visit.    HPI: Elodia Huber is a 38 year old female who presents today complaining of sore throat for the past 2 days. Endorses myalgia, subjective fever and chills.  Endorses decreased activity level and appetite as well.  Reports positive ill contacts at this work as patient is a .  Reports no OTC medications taken.  Denies any shortness of breath or wheezing.  Denies any diarrhea or dysuria.  Denies any nausea or vomiting.    History obtained from the patient.    Problem List:  2017-10: Morbid obesity (H)  2013-09: Vitamin D deficiency  2013-09: Obesity (BMI 30-39.9)  2013-09: Contraceptive management  2013-09: Cystic acne  2013-09: Milk intolerance  2013-09:  "Thyroid goiter  2012: CARDIOVASCULAR SCREENING; LDL GOAL LESS THAN 160  2012: Obese  2012: Eczema  Obesity, Class III, BMI 40-49.9 (morbid obesity) (H)      Past Medical History:   Diagnosis Date    Obesity, Class III, BMI 40-49.9 (morbid obesity) (H)     Thyroid goiter     Uses birth control        Social History     Tobacco Use    Smoking status: Former     Current packs/day: 0.00     Types: Cigarettes     Start date: 2005     Quit date: 2015     Years since quittin.9    Smokeless tobacco: Never   Substance Use Topics    Alcohol use: Yes     Comment: 1 time per week, 2 per time       Review of Systems  As noted in HPI    Vitals:    24 1047   BP: 130/84   Pulse: 99   Resp: 15   Temp: 99.4  F (37.4  C)   TempSrc: Oral   SpO2: 98%   Weight: 127 kg (280 lb)   Height: 1.727 m (5' 8\")       Physical Exam  Constitutional:       Appearance: She is ill-appearing. She is not toxic-appearing.   HENT:      Head: Normocephalic and atraumatic.      Ears:      Comments: Bilateral TMs with cloudy fluid present, erythemic, landmarks not visualized, mildly erythemic canal.     Nose: Congestion and rhinorrhea present.      Mouth/Throat:      Mouth: Mucous membranes are moist.      Pharynx: Posterior oropharyngeal erythema present.      Comments: Erythemic pharynx with mild swelling present, airway patent.  Eyes:      General: No scleral icterus.        Right eye: No discharge.         Left eye: No discharge.      Extraocular Movements: Extraocular movements intact.      Conjunctiva/sclera: Conjunctivae normal.      Pupils: Pupils are equal, round, and reactive to light.   Cardiovascular:      Rate and Rhythm: Normal rate and regular rhythm.      Pulses: Normal pulses.      Heart sounds: Normal heart sounds.   Pulmonary:      Effort: Pulmonary effort is normal.      Breath sounds: Normal breath sounds.   Abdominal:      Tenderness: There is no right CVA tenderness or left CVA tenderness. "   Musculoskeletal:      Cervical back: Neck supple.   Lymphadenopathy:      Cervical: Cervical adenopathy present.   Skin:     General: Skin is warm.      Capillary Refill: Capillary refill takes less than 2 seconds.      Findings: No rash.   Neurological:      Mental Status: She is alert and oriented to person, place, and time.         Labs:  Results for orders placed or performed in visit on 05/19/24   Streptococcus A Rapid Screen w/Reflex to PCR - Clinic Collect     Status: Abnormal    Specimen: Throat; Swab   Result Value Ref Range    Group A Strep antigen Positive (A) Negative     At the end of the encounter, I discussed results, diagnosis, medications. Discussed red flags for immediate return to clinic/ER, as well as indications for follow up if no improvement. Patient understood and agreed to plan.     JASON River CNP

## 2024-05-19 NOTE — LETTER
May 19, 2024      Elodia Huber  [facility] [place] [address]        To Whom It May Concern:    Elodia Huber  was seen on 05/19/2024.  Please excuse her  until 05/21/2024 due to illness.        Sincerely,        JASON River CNP

## 2024-06-21 ENCOUNTER — TELEPHONE (OUTPATIENT)
Dept: OBGYN | Facility: CLINIC | Age: 39
End: 2024-06-21
Payer: COMMERCIAL

## 2024-06-21 DIAGNOSIS — Z30.40 ENCOUNTER FOR REFILL OF PRESCRIPTION FOR CONTRACEPTION: ICD-10-CM

## 2024-06-21 RX ORDER — ACETAMINOPHEN AND CODEINE PHOSPHATE 120; 12 MG/5ML; MG/5ML
SOLUTION ORAL
Qty: 28 TABLET | Refills: 0 | Status: SHIPPED | OUTPATIENT
Start: 2024-06-21

## 2024-06-21 NOTE — TELEPHONE ENCOUNTER
Mychart msg sent stating refill cannot be filled due to not being seen since 2020.    ROX Crenshaw

## 2024-06-21 NOTE — TELEPHONE ENCOUNTER
[facility] Call Center    Phone Message    May a detailed message be left on voicemail: yes     Reason for Call: Medication Refill Request    Has the patient contacted the pharmacy for the refill? Yes   Name of medication being requested:   norethindrone (MICRONOR) 0.35 MG   Provider who prescribed the medication: Radha  Pharmacy: [id number] - [place], [place] - [address] [place] [address] & [address]  Date medication is needed: ASAP  Action Taken: Message routed to:  Other: CHRIS CURRY    Travel Screening: Not Applicable

## 2024-07-10 ENCOUNTER — OFFICE VISIT (OUTPATIENT)
Dept: MIDWIFE SERVICES | Facility: CLINIC | Age: 39
End: 2024-07-10
Payer: COMMERCIAL

## 2024-07-10 VITALS
HEIGHT: 68 IN | SYSTOLIC BLOOD PRESSURE: 128 MMHG | DIASTOLIC BLOOD PRESSURE: 84 MMHG | WEIGHT: 276.6 LBS | BODY MASS INDEX: 41.92 KG/M2 | HEART RATE: 90 BPM

## 2024-07-10 DIAGNOSIS — Z13.0 SCREENING, ANEMIA, DEFICIENCY, IRON: ICD-10-CM

## 2024-07-10 DIAGNOSIS — M25.571 PAIN IN JOINT, ANKLE AND FOOT, RIGHT: Primary | ICD-10-CM

## 2024-07-10 DIAGNOSIS — Z30.011 ENCOUNTER FOR INITIAL PRESCRIPTION OF CONTRACEPTIVE PILLS: ICD-10-CM

## 2024-07-10 DIAGNOSIS — Z13.220 LIPID SCREENING: ICD-10-CM

## 2024-07-10 DIAGNOSIS — Z13.1 ENCOUNTER FOR SCREENING EXAMINATION FOR IMPAIRED GLUCOSE REGULATION AND DIABETES MELLITUS: ICD-10-CM

## 2024-07-10 DIAGNOSIS — Z13.228 SCREENING FOR METABOLIC DISORDER: ICD-10-CM

## 2024-07-10 DIAGNOSIS — Z13.29 SCREENING FOR THYROID DISORDER: ICD-10-CM

## 2024-07-10 PROCEDURE — 99395 PREV VISIT EST AGE 18-39: CPT | Performed by: ADVANCED PRACTICE MIDWIFE

## 2024-07-10 PROCEDURE — 99212 OFFICE O/P EST SF 10 MIN: CPT | Mod: 25 | Performed by: ADVANCED PRACTICE MIDWIFE

## 2024-07-10 RX ORDER — NORETHINDRONE ACETATE AND ETHINYL ESTRADIOL 1MG-20(21)
1 KIT ORAL DAILY
Qty: 28 TABLET | Refills: 0 | Status: SHIPPED | OUTPATIENT
Start: 2024-07-10

## 2024-07-10 ASSESSMENT — PATIENT HEALTH QUESTIONNAIRE - PHQ9
10. IF YOU CHECKED OFF ANY PROBLEMS, HOW DIFFICULT HAVE THESE PROBLEMS MADE IT FOR YOU TO DO YOUR WORK, TAKE CARE OF THINGS AT HOME, OR GET ALONG WITH OTHER PEOPLE: NOT DIFFICULT AT ALL
SUM OF ALL RESPONSES TO PHQ QUESTIONS 1-9: 0
SUM OF ALL RESPONSES TO PHQ QUESTIONS 1-9: 0

## 2024-07-10 NOTE — PROGRESS NOTES
"ANNUAL EXAM    Subjective:     Elodia Huber is a 38 year old female who presents for an annual exam. She is a new patient to the [facility].  Family: Partner Anthony (together for 22 years since she wrote a note asking if he wanted to date her!), extended family in 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  \"I love my job!\"  Other:  Want birth control refill.    Healthy Habits:   Regular Exercise: None now.  Severe ankle injury last November, still needs PT.  Difficulty scheduling last semester due to finished. graduate degree.    Sunscreen Use: Every day in moisturizer  Healthy Diet: Vegetarian and CSA members.  No meat/fish.  Rare egg/dairy product use.  Dental Visits Regularly: \"It's a goal\", does not know when dentist visit was.   Eye Exams: No vision problems, within past 3 years.  Seat Belt: Always  Sexually active: Yes, with Anthony.  Mutually monogamous to best of her knowledge  Sexual Partners: 2 total partners in lifetime.  Negative STI screening in past.  Self Breast Exam Monthly: Yes  Mammogram and breast US 2/9/23 negative/WNL  Lipid Profile: last 5 years ago, excellent levels, agreeable to collect today, not fasting today.  Glucose Screen: last 5 years ago, excellent level, agreeable to collect today  Guns at Home: no  IPV: safe  Hx abuse: none  Tobacco/Alcohol/Drug Use: no smoking >10 years/ cocktail or two once per week/ marijuana 5 mg once per month.    Phq2 (   1999 [employer] Inc,All Rights Reserved. Used With Permission. Developed By Kendal Hernandez,Adele Patterson,Hari Myles And Colleagues,With An Educational Len From [employer] Inc.)    7/10/2024 10:17 AM CDT - Filed by Patient   Q1: Little interest or pleasure in doing things Nearly every day   Q2: Feeling down, depressed or hopeless Not at all   PHQ-2 Score (range: 0 - 6) 3   Over the last 2 weeks, how often have you been " bothered by any of the following problems?    1. Little interest or pleasure in doing things Not at all   2.  Feeling down, depressed, or hopeless Not at all   3.  Trouble falling or staying asleep, or sleeping too much Not at all   4.  Feeling tired or having little energy Not at all   5.  Poor appetite or overeating Not at all   6.  Feeling bad about yourself - or that you are a failure or have let yourself or your family down Not at all   7.  Trouble concentrating on things, such as reading the newspaper or watching television Not at all   8.  Moving or speaking so slowly that other people could have noticed. Or the opposite - being so fidgety or restless that you have been moving around a lot more than usual Not at all   9.  Thoughts that you would be better off dead, or of hurting yourself in some way Not at all   If you checked off any problems, how difficult have these problems made it for you to do your work, take care of things at home, or get along with other people? Not difficult at all   PHQ9 TOTAL SCORE (range: 0 - 27) 0     Myc Communicable Disease Screening    7/10/2024 10:17 AM CDT - Filed by Patient   Do you have any of the following new or worsening symptoms ? None of these   Have you had close contact with someone who has traveled outside the country in the past 30 days and is sick? No     Immunization History   Administered Date(s) Administered    COVID-19 MONOVALENT 12+ (Pfizer) 01/07/2022    COVID-19 Monovalent 18+ (Moderna) 02/17/2021, 03/24/2021    Influenza Vaccine (Flucelvax Quadrivalent) 10/13/2022, 10/18/2023    Influenza Vaccine >6 months,quad, PF 12/13/2018, 11/07/2020, 10/19/2021    TDAP (Adacel,Boostrix) 01/07/2022     Immunization status: Up to date.    Gynecologic History  Patient's last menstrual period was 07/07/2024 (exact date).  Contraception: oral birth control, daily norethindrone 0.35 mg.    Irregular cycles and undesired weight gain/inability to lose.  Unsure why she is on  progesterone only birth control.  No hx of cancer, hypertension, or other contraindications to estrogen use.    Interested in COCPs, possibly continuous/cycling every 3 months.    Cancer screening:   Last Pap: 2023.. Results were: NIL and HPV Negative    Last mammogram: 23. Results were: normal    OB History    Para Term  AB Living   0 0 0 0 0 0   SAB IAB Ectopic Multiple Live Births   0 0 0 0 0     Current Outpatient Medications   Medication Sig Dispense Refill    loratadine (CLARITIN) 10 MG tablet Take 10 mg by mouth daily      norethindrone (NORLYDA) 0.35 MG tablet TAKE 1 TABLET(0.35 MG) BY MOUTH DAILY 28 tablet 0    albuterol (PROAIR HFA/PROVENTIL HFA/VENTOLIN HFA) 108 (90 Base) MCG/ACT inhaler Inhale 2 puffs into the lungs every 6 hours for 30 days 18 g 0    norethindrone (MICRONOR) 0.35 MG tablet Take 1 tablet (0.35 mg) by mouth daily (Patient not taking: Reported on 7/10/2024) 84 tablet 0     Past Medical History:   Diagnosis Date    Obesity, Class III, BMI 40-49.9 (morbid obesity) (H)     Thyroid goiter     Uses birth control      Past Surgical History:   Procedure Laterality Date    TONSILLECTOMY ADULT  2010     No known drug allergy  Family History   Problem Relation Age of Onset    No Known Problems Mother     Cerebrovascular Disease Father 52    Hypertension Father     Cancer Maternal Grandfather         unknown    Cancer Paternal Grandmother         breast    Breast Cancer Paternal Grandmother      Social History     Socioeconomic History    Marital status: Single     Spouse name: Not on file    Number of children: Not on file    Years of education: Not on file    Highest education level: Not on file   Occupational History    Not on file   Tobacco Use    Smoking status: Former     Current packs/day: 0.00     Types: Cigarettes     Start date: 2005     Quit date: 2015     Years since quittin.0    Smokeless tobacco: Never   Substance and Sexual Activity    Alcohol  use: Yes     Comment: 1 time per week, 2 per time    Drug use: Not Currently     Comment: thc gummy    Sexual activity: Yes     Partners: Male     Birth control/protection: Pill   Other Topics Concern    Parent/sibling w/ CABG, MI or angioplasty before 65F 55M? No   Social History Narrative    Not on file     Social Determinants of Health     Financial Resource Strain: Low Risk  (2/9/2023)    Overall Financial Resource Strain (CARDIA)     Difficulty of Paying Living Expenses: Not very hard   Food Insecurity: No Food Insecurity (2/9/2023)    Hunger Vital Sign     Worried About Running Out of Food in the Last Year: Never true     Ran Out of Food in the Last Year: Never true   Transportation Needs: No Transportation Needs (2/9/2023)    PRAPARE - Transportation     Lack of Transportation (Medical): No     Lack of Transportation (Non-Medical): No   Physical Activity: Sufficiently Active (2/9/2023)    Exercise Vital Sign     Days of Exercise per Week: 5 days     Minutes of Exercise per Session: 30 min   Stress: No Stress Concern Present (2/9/2023)    [employer] - Occupational Stress Questionnaire     Feeling of Stress : Only a little   Social Connections: Moderately Integrated (2/9/2023)    Social Connection and Isolation Panel [NHANES]     Frequency of Communication with Friends and Family: More than three times a week     Frequency of Social Gatherings with Friends and Family: Once a week     Attends [employer] Services: Never     Active Member of Clubs or Organizations: Yes     Attends Club or Organization Meetings: Not on file     Marital Status: Living with partner   Interpersonal Safety: Not on file   Housing Stability: Low Risk  (2/9/2023)    Housing Stability Vital Sign     Unable to Pay for Housing in the Last Year: No     Number of Places Lived in the Last Year: 1     Unstable Housing in the Last Year: No     Review of Systems  General:  Denies problem except as otherwise  "noted.      Objective:      Vitals:    07/10/24 1327   BP: 128/84   Pulse: 90   Weight: 125.5 kg (276 lb 9.6 oz)   Height: 1.727 m (5' 8\")     Physical Exam:  General Appearance: Alert, cooperative, well groomed, articulate, pleasant, no distress, appears stated age  Skin: Skin color, texture, turgor normal, no rashes or lesions. No varicosities.  Throat: Lips, mucosa, and tongue normal; teeth and gums normal  HEENT: grossly normal; otoscopic and opthalmic exam not performed.   Neck: Supple, symmetrical, trachea midline, no adenopathy;  thyroid: symmetric, not enlarged, no tenderness/mass/nodules  Lungs: Clear to auscultation bilaterally, respirations unlabored  Breasts: No breast masses, tenderness, asymmetry, or nipple discharge. Nipples everted.  Fibrocystic breast tissue throughout.  Heart: Regular rate and rhythm, S1 and S2 normal, no murmur, rub, or gallop  Abdomen: Soft, non-tender. No organomegaly or masses.  Excellent core strength.  Pelvic: Declines, not due for pap screening.       Assessment & Plan:     1.) Healthy female exam.  - Education.  Discussed healthy nutritional choices such as increasing whole grains, fruits, vegetables, lean proteins while limiting sugars, salts, and processed foods. Encouraged PT to allow daily exercise, preferably outside.  Discussed importance of  daily weight bearing exercise. Encouraged her efforts.   Discussed SBE and s/s of breast cancer and patient encouraged to contact her provider with concerns. Discussed and encouraged routine dental care, making dental appointment as soon as able.  - Routine Screening Labs: Future lab orders placed for fasting lipid profile, Hgb A1c, CBC, CMP, and TSH with reflex to free T4.  2.) Not due for cervical cancer screening.  - Pap smear not done at today's visit. Next due for cervical cancer screening February 2028 .   3.) Desires Contraception: Rx given for Junel 1/20 with instructions on dosing and warning s/s.  4.) Problems:  Ankle " pain.  Referral placed for PT at [facility] per pt preference.  Encouraged to schedule.  5.)  RTC 1 year for annual physical exam, sooner PRN         Time spent on problems in addition to the annual exam on day of visit: 10 minutes.    JASON Merchant CNM   7/10/2024   Answers submitted by the patient for this visit:  Patient Health Questionnaire (Submitted on 7/10/2024)  If you checked off any problems, how difficult have these problems made it for you to do your work, take care of things at home, or get along with other people?: Not difficult at all  PHQ9 TOTAL SCORE: 0

## 2024-07-10 NOTE — PATIENT INSTRUCTIONS
"WELL DONE ON YOUR HAPPY LIFESTYLE!!!!  :-)  Keep up the great work with your job and your family/partner.    Three tasks:  1.) Make a dental appointment sometimes in the next 3 months  2.) Make a physical therapy appointment (check with [facility] for insurance and costs  3.) Make and come in for a lab only appointment (can schedule close to home, at any [facility] lab, first thing in am).  Fast at least 8 hours ahead of time, and bring your morning drink/breakfast with you.    Regarding your birth control-   We discussed timing your cycle for when you want your menstrual bleed (shorten placebo week).  May also take out placebo pill week and move straight into next pack so that you only menstruate once every 3 months.    CALL WITH ANY OF THE FOLLOWING \"ACHES\":    Abdominal pain  Chest pain  Headaches  Eye problems  Swelling and/or aching in the legs and thighs.      It was a delight to meet you!  Would love to see you again next your for your next annual exam.  Keep up the amazing work in your life.  "

## 2024-07-24 ENCOUNTER — LAB (OUTPATIENT)
Dept: LAB | Facility: CLINIC | Age: 39
End: 2024-07-24
Payer: COMMERCIAL

## 2024-07-24 DIAGNOSIS — Z13.0 SCREENING, ANEMIA, DEFICIENCY, IRON: ICD-10-CM

## 2024-07-24 DIAGNOSIS — Z13.1 ENCOUNTER FOR SCREENING EXAMINATION FOR IMPAIRED GLUCOSE REGULATION AND DIABETES MELLITUS: ICD-10-CM

## 2024-07-24 DIAGNOSIS — Z13.29 SCREENING FOR THYROID DISORDER: ICD-10-CM

## 2024-07-24 DIAGNOSIS — Z13.220 LIPID SCREENING: ICD-10-CM

## 2024-07-24 DIAGNOSIS — Z13.228 SCREENING FOR METABOLIC DISORDER: ICD-10-CM

## 2024-07-24 LAB
ALBUMIN SERPL BCG-MCNC: 3.9 G/DL (ref 3.5–5.2)
ALP SERPL-CCNC: 94 U/L (ref 40–150)
ALT SERPL W P-5'-P-CCNC: 8 U/L (ref 0–50)
ANION GAP SERPL CALCULATED.3IONS-SCNC: 9 MMOL/L (ref 7–15)
AST SERPL W P-5'-P-CCNC: 20 U/L (ref 0–45)
BASOPHILS # BLD AUTO: 0 10E3/UL (ref 0–0.2)
BASOPHILS NFR BLD AUTO: 0 %
BILIRUB SERPL-MCNC: 0.5 MG/DL
BUN SERPL-MCNC: 6.9 MG/DL (ref 6–20)
CALCIUM SERPL-MCNC: 8.6 MG/DL (ref 8.8–10.4)
CHLORIDE SERPL-SCNC: 105 MMOL/L (ref 98–107)
CHOLEST SERPL-MCNC: 153 MG/DL
CREAT SERPL-MCNC: 0.67 MG/DL (ref 0.51–0.95)
EGFRCR SERPLBLD CKD-EPI 2021: >90 ML/MIN/1.73M2
EOSINOPHIL # BLD AUTO: 0.2 10E3/UL (ref 0–0.7)
EOSINOPHIL NFR BLD AUTO: 2 %
ERYTHROCYTE [DISTWIDTH] IN BLOOD BY AUTOMATED COUNT: 12.8 % (ref 10–15)
FASTING STATUS PATIENT QL REPORTED: YES
FASTING STATUS PATIENT QL REPORTED: YES
GLUCOSE SERPL-MCNC: 82 MG/DL (ref 70–99)
HBA1C MFR BLD: 4.9 % (ref 0–5.6)
HCO3 SERPL-SCNC: 23 MMOL/L (ref 22–29)
HCT VFR BLD AUTO: 40.6 % (ref 35–47)
HDLC SERPL-MCNC: 40 MG/DL
HGB BLD-MCNC: 13.8 G/DL (ref 11.7–15.7)
IMM GRANULOCYTES # BLD: 0 10E3/UL
IMM GRANULOCYTES NFR BLD: 0 %
LDLC SERPL CALC-MCNC: 91 MG/DL
LYMPHOCYTES # BLD AUTO: 2.3 10E3/UL (ref 0.8–5.3)
LYMPHOCYTES NFR BLD AUTO: 28 %
MCH RBC QN AUTO: 27.9 PG (ref 26.5–33)
MCHC RBC AUTO-ENTMCNC: 34 G/DL (ref 31.5–36.5)
MCV RBC AUTO: 82 FL (ref 78–100)
MONOCYTES # BLD AUTO: 0.6 10E3/UL (ref 0–1.3)
MONOCYTES NFR BLD AUTO: 7 %
NEUTROPHILS # BLD AUTO: 5.2 10E3/UL (ref 1.6–8.3)
NEUTROPHILS NFR BLD AUTO: 63 %
NONHDLC SERPL-MCNC: 113 MG/DL
PLATELET # BLD AUTO: 301 10E3/UL (ref 150–450)
POTASSIUM SERPL-SCNC: 4.2 MMOL/L (ref 3.4–5.3)
PROT SERPL-MCNC: 6.6 G/DL (ref 6.4–8.3)
RBC # BLD AUTO: 4.94 10E6/UL (ref 3.8–5.2)
SODIUM SERPL-SCNC: 137 MMOL/L (ref 135–145)
TRIGL SERPL-MCNC: 112 MG/DL
TSH SERPL DL<=0.005 MIU/L-ACNC: 1.46 UIU/ML (ref 0.3–4.2)
WBC # BLD AUTO: 8.2 10E3/UL (ref 4–11)

## 2024-07-24 PROCEDURE — 36415 COLL VENOUS BLD VENIPUNCTURE: CPT

## 2024-07-24 PROCEDURE — 85025 COMPLETE CBC W/AUTO DIFF WBC: CPT

## 2024-07-24 PROCEDURE — 84443 ASSAY THYROID STIM HORMONE: CPT

## 2024-07-24 PROCEDURE — 80053 COMPREHEN METABOLIC PANEL: CPT

## 2024-07-24 PROCEDURE — 83036 HEMOGLOBIN GLYCOSYLATED A1C: CPT

## 2024-07-24 PROCEDURE — 80061 LIPID PANEL: CPT

## 2025-06-10 ENCOUNTER — PATIENT OUTREACH (OUTPATIENT)
Dept: CARE COORDINATION | Facility: CLINIC | Age: 40
End: 2025-06-10
Payer: COMMERCIAL

## 2025-06-24 ENCOUNTER — PATIENT OUTREACH (OUTPATIENT)
Dept: CARE COORDINATION | Facility: CLINIC | Age: 40
End: 2025-06-24
Payer: COMMERCIAL

## 2025-07-06 DIAGNOSIS — Z30.011 ENCOUNTER FOR INITIAL PRESCRIPTION OF CONTRACEPTIVE PILLS: ICD-10-CM

## 2025-07-07 NOTE — TELEPHONE ENCOUNTER
"Left message to call back for: Trisha  Information to relay to patient: RX refill request came in from pharmacy.  Please ask patient if she is using  \"norethindrone-ethinyl estradiol (JUNEL FE 1/20) 1-20 MG-MCG tablet \" as it appears that she has not received a refill since 8/2024.  "

## 2025-07-08 RX ORDER — NORETHINDRONE ACETATE AND ETHINYL ESTRADIOL 1MG-20(21)
1 KIT ORAL DAILY
Qty: 84 TABLET | OUTPATIENT
Start: 2025-07-08

## 2025-07-09 RX ORDER — NORETHINDRONE ACETATE AND ETHINYL ESTRADIOL 1MG-20(21)
1 KIT ORAL DAILY
Qty: 28 TABLET | Refills: 0 | Status: SHIPPED | OUTPATIENT
Start: 2025-07-09

## 2025-07-09 NOTE — TELEPHONE ENCOUNTER
Trisha returning call.  Trisha states that she has been on this medication for a year now.  No issues.  When asked if she received this medication refills from other provider, Trisha stated that she had a full year RX for this medication.  Trisha has an upcoming appointment with midwives on 7/23/25.  Will bridge 1 month until this visit.

## 2025-07-23 ENCOUNTER — OFFICE VISIT (OUTPATIENT)
Dept: MIDWIFE SERVICES | Facility: CLINIC | Age: 40
End: 2025-07-23
Payer: COMMERCIAL

## 2025-07-23 VITALS — WEIGHT: 275 LBS | BODY MASS INDEX: 41.68 KG/M2 | HEIGHT: 68 IN

## 2025-07-23 DIAGNOSIS — Z30.011 ENCOUNTER FOR INITIAL PRESCRIPTION OF CONTRACEPTIVE PILLS: ICD-10-CM

## 2025-07-23 DIAGNOSIS — E66.01 MORBID OBESITY (H): ICD-10-CM

## 2025-07-23 DIAGNOSIS — E66.813 OBESITY, CLASS III, BMI 40-49.9 (MORBID OBESITY) (H): ICD-10-CM

## 2025-07-23 DIAGNOSIS — Z30.09 BIRTH CONTROL COUNSELING: Primary | ICD-10-CM

## 2025-07-23 PROCEDURE — 99395 PREV VISIT EST AGE 18-39: CPT | Performed by: ADVANCED PRACTICE MIDWIFE

## 2025-07-23 RX ORDER — NORETHINDRONE ACETATE AND ETHINYL ESTRADIOL 1MG-20(21)
1 KIT ORAL DAILY
Qty: 28 TABLET | Refills: 11 | Status: SHIPPED | OUTPATIENT
Start: 2025-07-23

## 2025-07-23 NOTE — PROGRESS NOTES
"ANNUAL EXAM    Subjective:     Elodia Huber is a 39 year old female who presents for an annual exam. She is an established [facility] patient.  Family: Partner Anthony (together 23 years) and getting  next year! \"Wedding tour\" June-July 2026! Having 3 ceremonies, one in [place], one in [place], and one in [place], [place]!   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  Other:   Desires birth control refill.   Overall happy with Junel 1/20 and relieved to find this formulation can be cause of decreased menstrual bleeding.  Read a book about perimenopause symptoms and feels like she has a better understanding about it! Notes periods to be a bit irregular in regard to start of her sugar week pills and fairly light.     Healthy Habits:   Regular Exercise: Minimal with hurt ankle, although notes she does usually reach her goal of 10,000 steps a day.  Had wanted to follow up on therapy but could not due to insurance issues, and plans to follow up this coming year.  Sunscreen Use: Yes- uses facial cream with sunscreen.  Healthy Diet: Vegetarian since 15 y/o, eats a variety of food and uses CSA.  Dental Visits Regularly: No, did not schedule this past year again due to insurance issues and hopeful to schedule this coming year.  Eye Exams: Doesn't remember last visit, no problems noted.   Seat Belt: Yes, always.   Sexually active: Yes.   Sexual Partners: Believes mutually monogamous with Anthony, declines STD testing.   Self Breast Exam Monthly: Yes.   Last Breast Exam: Last year annually.  Colonoscopy: N/A  Lipid Profile: Last 1 year ago, excellent levels.   Glucose Screen: Last 1 year ago, excellent level.   Prevention of Osteoporosis: Cow's milk and walking.  No supplementation.  Guns at Home: no  IPV: No.   Hx abuse: No.   Tobacco/Alcohol/Drug Use: None/ 2-3 drinks per week/Occasional THC gummies otherwise none    Phq2 (   1999 Pfizer Inc,All Rights Reserved. " Used With Permission. Developed By Lanette Hernandez,Adele Hayes,Hari Ann And Colleagues,With An Educational Jeramy From [employer] Inc.)    7/23/2025  1:48 PM CDT - Filed by Patient   The following questionnaire should only be answered by the patient. Are you the patient? Yes   Over the last 2 weeks, how often have you been bothered by any of the following problems?    Q1: Little interest or pleasure in doing things Not at all   Q2: Feeling down, depressed or hopeless Not at all   PHQ2 (   1999 [employer] INC,ALL RIGHTS RESERVED. USED WITH PERMISSION. DEVELOPED BY LANETTE HERNANDEZ,ADELE HAYES,HARI ANN AND COLLEAGUES,WITH AN EDUCATIONAL JERAMY FROM [employer].)    PHQ-2 Score (range: 0 - 6) 0     Myc Communicable Disease Screening    7/18/2025 12:14 PM CDT - Filed by Patient   Do you have any of the following new or worsening symptoms ? None of these   Have you had close contact with someone who has traveled outside the country in the past 30 days and is sick? No     [facility] Add L Questions    7/18/2025 12:16 PM CDT - Filed by Patient   Please upload your Photo ID - examples:  's Licence, State ID, Passport      Welcome File Ready To Room Event    7/23/2025  1:48 PM CDT - Filed by Patient   Press 'Submit' to complete your questionnaires. Submit       Immunization History   Administered Date(s) Administered    COVID-19 MONOVALENT 12+ (Pfizer) 01/07/2022    COVID-19 Monovalent 18+ (Moderna) 02/17/2021, 03/24/2021    Influenza Vaccine (Flucelvax Quadrivalent) 10/13/2022, 10/18/2023    Influenza Vaccine >6 months,quad, PF 12/13/2018, 11/07/2020, 10/19/2021    TDAP (Adacel,Boostrix) 01/07/2022     Immunization status: up to date and documented.    Gynecologic History  Patient's last menstrual period was 07/17/2025 (exact date).  Contraception: oral contraceptives (estrogen/progesterone)    Cancer screening:   Last Pap: February 2023.. Results were: NIL and HPV Negative    Last  mammogram: 2023. Results were: normal.     OB History    Para Term  AB Living   0 0 0 0 0 0   SAB IAB Ectopic Multiple Live Births   0 0 0 0 0     Current Outpatient Medications   Medication Sig Dispense Refill    loratadine (CLARITIN) 10 MG tablet Take 10 mg by mouth daily      norethindrone-ethinyl estradiol (JUNEL FE 1/20) 1-20 MG-MCG tablet Take 1 tablet by mouth daily. 28 tablet 11     Past Medical History:   Diagnosis Date    Obesity, Class III, BMI 40-49.9 (morbid obesity) (H)     Thyroid goiter     Uses birth control      Past Surgical History:   Procedure Laterality Date    TONSILLECTOMY ADULT  2010     No known drug allergy  Family History   Problem Relation Age of Onset    No Known Problems Mother     Cerebrovascular Disease Father 52    Hypertension Father     Cancer Maternal Grandfather         unknown    Cancer Paternal Grandmother         breast    Breast Cancer Paternal Grandmother      Social History     Socioeconomic History    Marital status: Single     Spouse name: Not on file    Number of children: Not on file    Years of education: Not on file    Highest education level: Not on file   Occupational History    Not on file   Tobacco Use    Smoking status: Former     Current packs/day: 0.00     Types: Cigarettes     Start date: 2005     Quit date: 2015     Years since quitting: 10.1    Smokeless tobacco: Never   Substance and Sexual Activity    Alcohol use: Yes     Comment: 1 time per week, 2 per time    Drug use: Not Currently     Comment: thc gummy    Sexual activity: Yes     Partners: Male     Birth control/protection: Pill   Other Topics Concern    Parent/sibling w/ CABG, MI or angioplasty before 65F 55M? No   Social History Narrative    Not on file     Social Drivers of Health     Financial Resource Strain: Low Risk  (2023)    Overall Financial Resource Strain (CARDIA)     Difficulty of Paying Living Expenses: Not very hard   Food Insecurity: No Food Insecurity  "(2/9/2023)    Hunger Vital Sign     Worried About Running Out of Food in the Last Year: Never true     Ran Out of Food in the Last Year: Never true   Transportation Needs: No Transportation Needs (2/9/2023)    PRAPARE - Transportation     Lack of Transportation (Medical): No     Lack of Transportation (Non-Medical): No   Physical Activity: Sufficiently Active (2/9/2023)    Exercise Vital Sign     Days of Exercise per Week: 5 days     Minutes of Exercise per Session: 30 min   Stress: No Stress Concern Present (2/9/2023)    [employer] [employer] - Occupational Stress Questionnaire     Feeling of Stress : Only a little   Social Connections: Moderately Integrated (2/9/2023)    Social Connection and Isolation Panel [NHANES]     Frequency of Communication with Friends and Family: More than three times a week     Frequency of Social Gatherings with Friends and Family: Once a week     Attends Adventism Services: Never     Active Member of Clubs or Organizations: Yes     Attends Club or Organization Meetings: Not on file     Marital Status: Living with partner   Interpersonal Safety: Not on file   Housing Stability: Low Risk  (2/9/2023)    Housing Stability Vital Sign     Unable to Pay for Housing in the Last Year: No     Number of Places Lived in the Last Year: 1     Unstable Housing in the Last Year: No     Review of Systems  General:  Denies problem except as otherwise noted.      Objective:      Vitals:    07/23/25 1402   Weight: 124.7 kg (275 lb)   Height: 1.721 m (5' 7.75\")     Physical Exam:  General Appearance: Alert, cooperative, no distress, appears stated age  Skin: Skin color and texture normal, no rashes or lesions. No varicosities.  Throat: Lips, mucosa, and tongue normal; teeth and gums normal  HEENT: grossly normal; otoscopic and opthalmic exam not performed.   Neck: Supple, symmetrical, trachea midline, no adenopathy.   Lungs: Clear to auscultation bilaterally, respirations " unlabored.  Breasts: No breast masses, tenderness, asymmetry, or nipple discharge noted. Nipples everted.   Heart: Regular rate and rhythm, S1 and S2 normal, no murmur, rub, or gallop.   Abdomen: Soft, non-tender. No masses.   Pelvic: Not indicated       Assessment & Plan:     1.) Healthy female exam.  - Education.  Discussed healthy nutritional choices such as increasing whole grains, fruits, vegetables, lean proteins while limiting sugars, salts, and processed foods. Encouraged daily exercise, preferably outside.  Discussed importance of  daily Vitamin D, calcium, and weight bearing exercise. Encouraged her efforts.   Discussed SBE and s/s of breast cancer and patient encouraged to contact her provider with concerns.   - Routine Screening:  Labs: Not indicated. TSH, CBC, CMP, Lipid panel, & Hgb A1c 1 year ago with excellent levels.   STD: Declines.   2.) Not due for cervical cancer screening.  - Pap smear not done at today's visit. Next due for cervical cancer screening February 2028.   3.) Contraception: oral contraceptives (estrogen/progesterone). Rx refilled for Junel 1/20, #28 tablets with 11 refills.   4.) Planning to see PT for ankle pain, will reach out if needing another referral.   Also planning to see dentist this coming year.  5.) RTC 1 year for annual physical exam, or PRN       Patient was seen with student, EDGAR Brown who was present for learning. I personally assessed, examined and made clinical decisions reflected in the documentation.     JASON Merchant CNM   7/23/2025